# Patient Record
Sex: FEMALE | Race: WHITE | NOT HISPANIC OR LATINO | Employment: FULL TIME | URBAN - METROPOLITAN AREA
[De-identification: names, ages, dates, MRNs, and addresses within clinical notes are randomized per-mention and may not be internally consistent; named-entity substitution may affect disease eponyms.]

---

## 2017-02-13 ENCOUNTER — ALLSCRIPTS OFFICE VISIT (OUTPATIENT)
Dept: OTHER | Facility: OTHER | Age: 47
End: 2017-02-13

## 2017-03-29 ENCOUNTER — ALLSCRIPTS OFFICE VISIT (OUTPATIENT)
Dept: OTHER | Facility: OTHER | Age: 47
End: 2017-03-29

## 2017-11-29 ENCOUNTER — HOSPITAL ENCOUNTER (OUTPATIENT)
Dept: MAMMOGRAPHY | Facility: MEDICAL CENTER | Age: 47
Discharge: HOME | End: 2017-11-29
Payer: COMMERCIAL

## 2017-11-29 DIAGNOSIS — Z12.31 ENCOUNTER FOR SCREENING MAMMOGRAM FOR MALIGNANT NEOPLASM OF BREAST: ICD-10-CM

## 2017-11-29 PROCEDURE — 77063 BREAST TOMOSYNTHESIS BI: CPT

## 2017-11-29 PROCEDURE — G0202 SCR MAMMO BI INCL CAD: HCPCS

## 2018-01-10 NOTE — MISCELLANEOUS
Message   Recorded as Task   Date: 11/15/2016 05:06 PM, Created By: [facility]   Task Name: Care Coordination   Assigned To: Jasmyne Maurer   Regarding Patient: Marquez aJcob, Status: Active   Comment:    Perlita Brooks - 15 Nov 2016 5:06 PM     TASK CREATED  pt called req correction of 3d karlo slip given to her    ins will not pay with diag of dense breasts    needs to be submitted as screening    new slip placed in allscripts and faxed to [facility]        Active Problems    1  Acute recurrent maxillary sinusitis (461 0) (J01 01)   2  Anxiety (300 00) (F41 9)   3  Dense breasts (793 82) (R92 2)   4  Encounter for gynecological examination without abnormal finding (V72 31) (Z01 419)   5  Encounter for routine gynecological examination (V72 31) (Z01 419)   6  Encounter for screening mammogram for malignant neoplasm of breast (V76 12)   (Z12 31)   7  Need for influenza vaccination (V04 81) (Z23)   8  Nephrolithiasis (592 0) (N20 0)   9  Screening for endocrine, nutritional, metabolic and immunity disorder (V77 99)   (Z13 29,Z13 0,Z13 21,Z13 228)   10  Screening for human papillomavirus (HPV) (V73 81) (Z11 51)   11  Screening for lipoid disorders (V77 91) (Z13 220)   12  Screening for thyroid disorder (V77 0) (Z13 29)   13  Screening, anemia, deficiency, iron (V78 0) (Z13 0)   14  Thyromegaly (240 9) (E04 9)   15  Visit for screening mammogram (V76 12) (Z12 31)   16  Vitamin D deficiency (268 9) (E55 9)    Current Meds   1  Calcium + D3 600-200 MG-UNIT Oral Tablet; Therapy: (Recorded:[id number]) to Recorded   2  Cefuroxime Axetil 250 MG Oral Tablet; TAKE 1 TABLET EVERY 12 HOURS DAILY; Therapy: 64NDH4414 to (Evaluate:17Jan2016)  Requested for: 98RXZ3205; Last   Rx:07Jan2016 Ordered   3  LORazepam 0 5 MG Oral Tablet; TAKE ONE (1) TABLET(S) TWICE DAILY; Therapy: 42NQO2308 to (Evaluate:15Ods1777); Last Rx:07Nov2016 Ordered    Allergies    1  No Known Drug Allergies    2   Seasonal    Plan  Encounter for screening mammogram for malignant neoplasm of breast    · MAMMO SCREENING BILATERAL W 3D & CAD; Status:Hold For -  Scheduling,Retrospective By Protocol Authorization;  Requested for:[id number];     Signatures   Electronically signed by : Terri Patterson, ; Nov 15 2016  5:06PM EST                       (Author)

## 2018-01-13 VITALS
HEART RATE: 75 BPM | TEMPERATURE: 98.7 F | SYSTOLIC BLOOD PRESSURE: 110 MMHG | BODY MASS INDEX: 21.26 KG/M2 | HEIGHT: 70 IN | DIASTOLIC BLOOD PRESSURE: 80 MMHG | OXYGEN SATURATION: 98 % | WEIGHT: 148.5 LBS

## 2018-01-13 VITALS
SYSTOLIC BLOOD PRESSURE: 106 MMHG | WEIGHT: 149 LBS | DIASTOLIC BLOOD PRESSURE: 62 MMHG | BODY MASS INDEX: 21.33 KG/M2 | HEIGHT: 70 IN

## 2018-02-14 ENCOUNTER — OFFICE VISIT (OUTPATIENT)
Dept: OBGYN CLINIC | Facility: CLINIC | Age: 48
End: 2018-02-14
Payer: COMMERCIAL

## 2018-02-14 VITALS
HEIGHT: 69 IN | BODY MASS INDEX: 21.74 KG/M2 | WEIGHT: 146.8 LBS | SYSTOLIC BLOOD PRESSURE: 118 MMHG | DIASTOLIC BLOOD PRESSURE: 72 MMHG

## 2018-02-14 DIAGNOSIS — Z01.419 ENCOUNTER FOR GYNECOLOGICAL EXAMINATION WITHOUT ABNORMAL FINDING: Primary | ICD-10-CM

## 2018-02-14 DIAGNOSIS — Z12.31 ENCOUNTER FOR SCREENING MAMMOGRAM FOR MALIGNANT NEOPLASM OF BREAST: ICD-10-CM

## 2018-02-14 DIAGNOSIS — N81.6 RECTOCELE: ICD-10-CM

## 2018-02-14 PROCEDURE — S0612 ANNUAL GYNECOLOGICAL EXAMINA: HCPCS | Performed by: OBSTETRICS & GYNECOLOGY

## 2018-02-14 RX ORDER — LORAZEPAM 0.5 MG/1
1 TABLET ORAL 2 TIMES DAILY
COMMUNITY
Start: 2017-03-29 | End: 2020-06-15 | Stop reason: SDUPTHER

## 2018-02-14 NOTE — PROGRESS NOTES
Assessment/Plan:    No problem-specific Assessment & Plan notes found for this encounter  Diagnoses and all orders for this visit:    Encounter for gynecological examination without abnormal finding    Encounter for screening mammogram for malignant neoplasm of breast  -     Mammo screening bilateral w 3d & cad; Future    Rectocele    Other orders  -     LORazepam (ATIVAN) 0 5 mg tablet; Take 1 tablet by mouth 2 (two) times a day  -     Calcium Carb-Cholecalciferol (CALCIUM + D3) 600-200 MG-UNIT TABS; Take by mouth        Annual examination was completed  Mammogram was ordered  We discussed her rectocele and weak pelvic muscles  Kegel's exercises were reviewed  Patient was also given other precautions to help minimize her pelvic relaxation  She will call if that becomes a progressive issue otherwise to return to the office in 1 year  Subjective:      Patient ID: Niko Rodriguez is a 50 y o  female  Patient returns for annual gyn visit  She has no significant new medical surgical issues  She does note continued premenstrual breast discomfort she has no menses since her ablation  Patient also noting a new issue with a slight bulge at the vagina with significant straining  She has no bothersome bowel or bladder issues  Gynecologic Exam   The patient's pertinent negatives include no pelvic pain or vaginal discharge  The following portions of the patient's history were reviewed and updated as appropriate: allergies, current medications, past family history, past medical history, past social history, past surgical history and problem list     Review of Systems   Constitutional: Negative  HENT: Negative  Eyes: Negative  Respiratory: Negative  Cardiovascular: Negative  Gastrointestinal: Negative  Endocrine: Negative  Genitourinary: Negative for menstrual problem (normal flow and duration), pelvic pain, vaginal discharge and vaginal pain  Musculoskeletal: Negative  Skin: Negative  Allergic/Immunologic: Negative  Neurological: Negative  Psychiatric/Behavioral: Negative  Objective:    Vitals:    02/14/18 1017   BP: 118/72        Physical Exam   Constitutional: She is oriented to person, place, and time  She appears well-developed and well-nourished  HENT:   Head: Normocephalic and atraumatic  Nose: Nose normal    Eyes: EOM are normal  Pupils are equal, round, and reactive to light  Neck: Normal range of motion  Neck supple  No thyromegaly present  Cardiovascular: Normal rate and regular rhythm  Pulmonary/Chest: Effort normal and breath sounds normal  No respiratory distress  Breasts no masses, tenderness, skin changes   Abdominal: Soft  Bowel sounds are normal  She exhibits no distension and no mass  There is no tenderness  Hernia confirmed negative in the right inguinal area and confirmed negative in the left inguinal area  Genitourinary: Vagina normal and uterus normal  No breast swelling, tenderness, discharge or bleeding  Pelvic exam was performed with patient supine  No labial fusion  There is no rash, tenderness, lesion or injury on the right labia  There is no rash, tenderness, lesion or injury on the left labia  Cervix exhibits no motion tenderness, no discharge and no friability  Genitourinary Comments: Ext genitalia nl female w/o lesions  Cervix healthy w/o lesions or discharge  uterus nl size, NT, no mass  Adnexa NT, no mass  Rectocele  Levators weak b/l   Musculoskeletal: Normal range of motion  She exhibits no edema  Lymphadenopathy:        Right: No inguinal adenopathy present  Left: No inguinal adenopathy present  Neurological: She is alert and oriented to person, place, and time  She has normal reflexes  Skin: Skin is warm and dry  No rash noted  Psychiatric: She has a normal mood and affect  Her behavior is normal  Thought content normal    Nursing note and vitals reviewed

## 2018-12-05 ENCOUNTER — HOSPITAL ENCOUNTER (OUTPATIENT)
Dept: MAMMOGRAPHY | Facility: MEDICAL CENTER | Age: 48
Discharge: HOME | End: 2018-12-05
Payer: COMMERCIAL

## 2018-12-05 VITALS — BODY MASS INDEX: 23.7 KG/M2 | HEIGHT: 69 IN | WEIGHT: 160 LBS

## 2018-12-05 DIAGNOSIS — Z12.31 ENCOUNTER FOR SCREENING MAMMOGRAM FOR MALIGNANT NEOPLASM OF BREAST: ICD-10-CM

## 2018-12-05 PROCEDURE — 77063 BREAST TOMOSYNTHESIS BI: CPT

## 2018-12-05 PROCEDURE — 77067 SCR MAMMO BI INCL CAD: CPT

## 2019-02-20 ENCOUNTER — ANNUAL EXAM (OUTPATIENT)
Dept: OBGYN CLINIC | Facility: CLINIC | Age: 49
End: 2019-02-20
Payer: COMMERCIAL

## 2019-02-20 VITALS
SYSTOLIC BLOOD PRESSURE: 118 MMHG | WEIGHT: 160.2 LBS | DIASTOLIC BLOOD PRESSURE: 62 MMHG | HEIGHT: 69 IN | BODY MASS INDEX: 23.73 KG/M2

## 2019-02-20 DIAGNOSIS — Z01.419 ENCOUNTER FOR GYNECOLOGICAL EXAMINATION (GENERAL) (ROUTINE) WITHOUT ABNORMAL FINDINGS: Primary | ICD-10-CM

## 2019-02-20 DIAGNOSIS — Z12.39 SCREENING FOR MALIGNANT NEOPLASM OF BREAST: ICD-10-CM

## 2019-02-20 DIAGNOSIS — R92.2 DENSE BREAST: ICD-10-CM

## 2019-02-20 DIAGNOSIS — Z11.51 SCREENING FOR HUMAN PAPILLOMAVIRUS (HPV): ICD-10-CM

## 2019-02-20 DIAGNOSIS — R63.5 WEIGHT GAIN: ICD-10-CM

## 2019-02-20 DIAGNOSIS — Z12.4 CERVICAL CANCER SCREENING: ICD-10-CM

## 2019-02-20 PROBLEM — R92.30 DENSE BREAST: Status: ACTIVE | Noted: 2019-02-20

## 2019-02-20 PROCEDURE — S0612 ANNUAL GYNECOLOGICAL EXAMINA: HCPCS | Performed by: OBSTETRICS & GYNECOLOGY

## 2019-02-20 PROCEDURE — 87624 HPV HI-RISK TYP POOLED RSLT: CPT | Performed by: OBSTETRICS & GYNECOLOGY

## 2019-02-20 PROCEDURE — G0145 SCR C/V CYTO,THINLAYER,RESCR: HCPCS | Performed by: PATHOLOGY

## 2019-02-20 PROCEDURE — G0124 SCREEN C/V THIN LAYER BY MD: HCPCS | Performed by: PATHOLOGY

## 2019-02-20 NOTE — PROGRESS NOTES
Assessment/Plan:    No problem-specific Assessment & Plan notes found for this encounter  Diagnoses and all orders for this visit:    Encounter for gynecological examination (general) (routine) without abnormal findings  -     Liquid-based pap, screening    Dense breast  -     Mammo screening bilateral w 3d & cad; Future    Screening for malignant neoplasm of breast  -     Mammo screening bilateral w 3d & cad; Future    Cervical cancer screening  -     Liquid-based pap, screening    Screening for human papillomavirus (HPV)  -     Liquid-based pap, screening    Weight gain  -     TSH, 3rd generation; Future        Annual examination was completed  Pap with HPV testing was obtained  Mammogram was ordered  Patient was ordered a TSH to rule out thyroid disease as she has a strong family history  We did discuss the typical chronology of events with menopause  I have encouraged patient increase her exercise routine  Follow-up will be via phone patient otherwise return to the office in 1 year  Subjective:      Patient ID: Leelee Hankins is a 46 y o  female  Patient returns for annual gyn visit  She has complains only of 10 lb weight gain over this past year  She does have regular exercise and good nutrition  She does continue to have occasional spotting primarily with bowel movements  She has no significant menopausal symptoms  Gynecologic Exam   The patient's pertinent negatives include no pelvic pain or vaginal discharge  The following portions of the patient's history were reviewed and updated as appropriate:   She  has a past medical history of Anemia, External hemorrhoids, Hematuria, Lyme disease (11/2004), Migraine, and Varicose veins of both lower extremities    She   Patient Active Problem List    Diagnosis Date Noted    Encounter for gynecological examination (general) (routine) without abnormal findings 02/20/2019    Dense breast 02/20/2019    Screening for malignant neoplasm of breast 02/20/2019    Cervical cancer screening 02/20/2019    Screening for human papillomavirus (HPV) 02/20/2019    Weight gain 02/20/2019    Rectocele 02/14/2018     She  has a past surgical history that includes Dilation and curettage, diagnostic / therapeutic; Endometrial biopsy (10/21/2008); Polypectomy; Hemorrhoid surgery; and Hysteroscopy w/ endometrial ablation (05/24/2010)  Her family history includes Anemia in her family; Anxiety disorder in her family; Arthritis in her family; Depression in her family; Diabetes in her family and paternal grandmother; Heart disease in her mother; Hypertension in her mother; Lung cancer in her family; Migraines in her family; Paget's disease of bone in her family; Paget's disease of bone (age of onset: 76) in her mother; Prostate cancer in her father; Thyroid disease in her family; Varicose Veins in her family  She  reports that she has never smoked  She has never used smokeless tobacco  She reports that she drinks alcohol  She reports that she does not use drugs  Current Outpatient Medications   Medication Sig Dispense Refill    Calcium Carb-Cholecalciferol (CALCIUM + D3) 600-200 MG-UNIT TABS Take by mouth      LORazepam (ATIVAN) 0 5 mg tablet Take 1 tablet by mouth 2 (two) times a day       No current facility-administered medications for this visit  Current Outpatient Medications on File Prior to Visit   Medication Sig    Calcium Carb-Cholecalciferol (CALCIUM + D3) 600-200 MG-UNIT TABS Take by mouth    LORazepam (ATIVAN) 0 5 mg tablet Take 1 tablet by mouth 2 (two) times a day     No current facility-administered medications on file prior to visit  She is allergic to pollen extract       Review of Systems   Constitutional: Negative  HENT: Negative  Eyes: Negative  Respiratory: Negative  Cardiovascular: Negative  Gastrointestinal: Negative  Endocrine: Negative      Genitourinary: Negative for menstrual problem (normal flow and duration), pelvic pain, vaginal discharge and vaginal pain  Musculoskeletal: Negative  Skin: Negative  Allergic/Immunologic: Negative  Neurological: Negative  Psychiatric/Behavioral: Negative  All other systems reviewed and are negative  Objective:      /62 (BP Location: Left arm, Patient Position: Sitting)   Ht 5' 9" (1 753 m)   Wt 72 7 kg (160 lb 3 2 oz)   BMI 23 66 kg/m²          Physical Exam   Constitutional: She is oriented to person, place, and time  She appears well-developed and well-nourished  HENT:   Head: Normocephalic and atraumatic  Nose: Nose normal    Eyes: Pupils are equal, round, and reactive to light  EOM are normal    Neck: Normal range of motion  Neck supple  No thyromegaly present  Cardiovascular: Normal rate and regular rhythm  Pulmonary/Chest: Effort normal and breath sounds normal  No respiratory distress  No breast tenderness, discharge or bleeding  Breasts symmetrical without masses, skin changes or adenopathy   Abdominal: Soft  Bowel sounds are normal  She exhibits no distension and no mass  There is no tenderness  Hernia confirmed negative in the right inguinal area and confirmed negative in the left inguinal area  Genitourinary: Vagina normal and uterus normal  No breast tenderness, discharge or bleeding  Pelvic exam was performed with patient supine  No labial fusion  There is no rash, tenderness, lesion or injury on the right labia  There is no rash, tenderness, lesion or injury on the left labia  Cervix exhibits no motion tenderness, no discharge and no friability  Genitourinary Comments: Ext gen nl w/o lesions  Vag healthy w/o lesions or discharge  Cervix healthy w/o lesions  Uterus nl size, NT  Adnexa NT no masses  Rectal no masses   Musculoskeletal: Normal range of motion  She exhibits no edema  Lymphadenopathy:        Right: No inguinal adenopathy present  Left: No inguinal adenopathy present     Neurological: She is alert and oriented to person, place, and time  She has normal reflexes  Skin: Skin is warm and dry  No rash noted  Psychiatric: She has a normal mood and affect  Her behavior is normal  Thought content normal    Nursing note and vitals reviewed

## 2019-02-22 LAB
HPV HR 12 DNA CVX QL NAA+PROBE: POSITIVE
HPV16 DNA CVX QL NAA+PROBE: NEGATIVE
HPV18 DNA CVX QL NAA+PROBE: NEGATIVE

## 2019-02-27 LAB
LAB AP GYN PRIMARY INTERPRETATION: ABNORMAL
Lab: ABNORMAL
PATH INTERP SPEC-IMP: ABNORMAL

## 2019-03-05 ENCOUNTER — TELEPHONE (OUTPATIENT)
Dept: OBGYN CLINIC | Facility: CLINIC | Age: 49
End: 2019-03-05

## 2019-03-05 NOTE — TELEPHONE ENCOUNTER
Patient scheduled for 5/1/19 at 1:00 for colposcopy  She is aware to take 2 aleve/ibuprofen prior to appt

## 2019-03-05 NOTE — TELEPHONE ENCOUNTER
----- Message from Juvenal Norris DO sent at 2/28/2019  9:28 AM EST -----  Please inform patient that Pap smear demonstrates ASCUS with positive high-risk HPV  Please arrange colposcopy within the next 8 weeks

## 2019-03-28 ENCOUNTER — TELEPHONE (OUTPATIENT)
Dept: OBGYN CLINIC | Facility: CLINIC | Age: 49
End: 2019-03-28

## 2019-03-28 LAB — TSH SERPL DL<=0.005 MIU/L-ACNC: 2.72 UIU/ML (ref 0.45–4.5)

## 2019-05-01 ENCOUNTER — OFFICE VISIT (OUTPATIENT)
Dept: OBGYN CLINIC | Facility: CLINIC | Age: 49
End: 2019-05-01
Payer: COMMERCIAL

## 2019-05-01 VITALS — DIASTOLIC BLOOD PRESSURE: 80 MMHG | BODY MASS INDEX: 23.75 KG/M2 | SYSTOLIC BLOOD PRESSURE: 124 MMHG | WEIGHT: 160.8 LBS

## 2019-05-01 DIAGNOSIS — R87.810 ASCUS WITH POSITIVE HIGH RISK HPV CERVICAL: Primary | ICD-10-CM

## 2019-05-01 DIAGNOSIS — R87.610 ASCUS WITH POSITIVE HIGH RISK HPV CERVICAL: Primary | ICD-10-CM

## 2019-05-01 PROCEDURE — 99213 OFFICE O/P EST LOW 20 MIN: CPT | Performed by: OBSTETRICS & GYNECOLOGY

## 2019-05-01 PROCEDURE — 88305 TISSUE EXAM BY PATHOLOGIST: CPT | Performed by: PATHOLOGY

## 2019-05-01 PROCEDURE — 57454 BX/CURETT OF CERVIX W/SCOPE: CPT | Performed by: OBSTETRICS & GYNECOLOGY

## 2019-05-06 ENCOUNTER — TELEPHONE (OUTPATIENT)
Dept: OBGYN CLINIC | Facility: CLINIC | Age: 49
End: 2019-05-06

## 2020-06-09 ENCOUNTER — ANNUAL EXAM (OUTPATIENT)
Dept: OBGYN CLINIC | Facility: CLINIC | Age: 50
End: 2020-06-09
Payer: COMMERCIAL

## 2020-06-09 VITALS
SYSTOLIC BLOOD PRESSURE: 110 MMHG | DIASTOLIC BLOOD PRESSURE: 68 MMHG | BODY MASS INDEX: 23.91 KG/M2 | TEMPERATURE: 97.4 F | WEIGHT: 161.4 LBS | HEIGHT: 69 IN

## 2020-06-09 DIAGNOSIS — Z12.4 CERVICAL CANCER SCREENING: ICD-10-CM

## 2020-06-09 DIAGNOSIS — Z11.51 SCREENING FOR HUMAN PAPILLOMAVIRUS (HPV): ICD-10-CM

## 2020-06-09 DIAGNOSIS — Z12.31 ENCOUNTER FOR SCREENING MAMMOGRAM FOR MALIGNANT NEOPLASM OF BREAST: ICD-10-CM

## 2020-06-09 DIAGNOSIS — Z01.419 ENCOUNTER FOR GYNECOLOGICAL EXAMINATION (GENERAL) (ROUTINE) WITHOUT ABNORMAL FINDINGS: Primary | ICD-10-CM

## 2020-06-09 PROCEDURE — 87624 HPV HI-RISK TYP POOLED RSLT: CPT | Performed by: OBSTETRICS & GYNECOLOGY

## 2020-06-09 PROCEDURE — G0145 SCR C/V CYTO,THINLAYER,RESCR: HCPCS | Performed by: OBSTETRICS & GYNECOLOGY

## 2020-06-09 PROCEDURE — 99396 PREV VISIT EST AGE 40-64: CPT | Performed by: OBSTETRICS & GYNECOLOGY

## 2020-06-15 ENCOUNTER — OFFICE VISIT (OUTPATIENT)
Dept: FAMILY MEDICINE CLINIC | Facility: CLINIC | Age: 50
End: 2020-06-15
Payer: COMMERCIAL

## 2020-06-15 VITALS
TEMPERATURE: 98.3 F | BODY MASS INDEX: 23.81 KG/M2 | WEIGHT: 160.75 LBS | SYSTOLIC BLOOD PRESSURE: 118 MMHG | OXYGEN SATURATION: 96 % | HEART RATE: 70 BPM | DIASTOLIC BLOOD PRESSURE: 76 MMHG | HEIGHT: 69 IN

## 2020-06-15 DIAGNOSIS — F41.1 GENERALIZED ANXIETY DISORDER: ICD-10-CM

## 2020-06-15 DIAGNOSIS — Z13.0 SCREENING FOR IRON DEFICIENCY ANEMIA: ICD-10-CM

## 2020-06-15 DIAGNOSIS — Z13.0 SCREENING FOR ENDOCRINE, METABOLIC AND IMMUNITY DISORDER: ICD-10-CM

## 2020-06-15 DIAGNOSIS — Z13.29 SCREENING FOR ENDOCRINE, METABOLIC AND IMMUNITY DISORDER: ICD-10-CM

## 2020-06-15 DIAGNOSIS — Z13.29 SCREENING FOR THYROID DISORDER: ICD-10-CM

## 2020-06-15 DIAGNOSIS — M25.50 MULTIPLE JOINT PAIN: ICD-10-CM

## 2020-06-15 DIAGNOSIS — Z13.228 SCREENING FOR ENDOCRINE, METABOLIC AND IMMUNITY DISORDER: ICD-10-CM

## 2020-06-15 DIAGNOSIS — Z13.220 SCREENING FOR LIPID DISORDERS: ICD-10-CM

## 2020-06-15 DIAGNOSIS — Z00.00 ANNUAL PHYSICAL EXAM: Primary | ICD-10-CM

## 2020-06-15 PROCEDURE — 99386 PREV VISIT NEW AGE 40-64: CPT | Performed by: FAMILY MEDICINE

## 2020-06-15 PROCEDURE — 3008F BODY MASS INDEX DOCD: CPT | Performed by: FAMILY MEDICINE

## 2020-06-15 RX ORDER — DOXYCYCLINE HYCLATE 100 MG/1
100 CAPSULE ORAL EVERY 12 HOURS SCHEDULED
Qty: 28 CAPSULE | Refills: 0 | Status: SHIPPED | OUTPATIENT
Start: 2020-06-15 | End: 2020-06-29

## 2020-06-15 RX ORDER — LORAZEPAM 0.5 MG/1
0.5 TABLET ORAL 2 TIMES DAILY
Qty: 30 TABLET | Refills: 0 | Status: SHIPPED | OUTPATIENT
Start: 2020-06-15 | End: 2021-07-29 | Stop reason: SDUPTHER

## 2020-06-17 LAB
ALBUMIN SERPL-MCNC: 4.3 G/DL (ref 3.6–5.1)
ALBUMIN/GLOB SERPL: 1.9 (CALC) (ref 1–2.5)
ALP SERPL-CCNC: 40 U/L (ref 37–153)
ALT SERPL-CCNC: 8 U/L (ref 6–29)
AST SERPL-CCNC: 10 U/L (ref 10–35)
B BURGDOR AB SER IA-ACNC: <0.9 INDEX
BASOPHILS # BLD AUTO: 19 CELLS/UL (ref 0–200)
BASOPHILS NFR BLD AUTO: 0.5 %
BILIRUB SERPL-MCNC: 0.5 MG/DL (ref 0.2–1.2)
BUN SERPL-MCNC: 20 MG/DL (ref 7–25)
BUN/CREAT SERPL: ABNORMAL (CALC) (ref 6–22)
CALCIUM SERPL-MCNC: 8.9 MG/DL (ref 8.6–10.4)
CHLORIDE SERPL-SCNC: 104 MMOL/L (ref 98–110)
CHOLEST SERPL-MCNC: 169 MG/DL
CHOLEST/HDLC SERPL: 2.6 (CALC)
CO2 SERPL-SCNC: 28 MMOL/L (ref 20–32)
CREAT SERPL-MCNC: 0.6 MG/DL (ref 0.5–1.05)
CRP SERPL-MCNC: 0.9 MG/L
EOSINOPHIL # BLD AUTO: 61 CELLS/UL (ref 15–500)
EOSINOPHIL NFR BLD AUTO: 1.6 %
ERYTHROCYTE [DISTWIDTH] IN BLOOD BY AUTOMATED COUNT: 12.3 % (ref 11–15)
GLOBULIN SER CALC-MCNC: 2.3 G/DL (CALC) (ref 1.9–3.7)
GLUCOSE SERPL-MCNC: 107 MG/DL (ref 65–99)
HCT VFR BLD AUTO: 37 % (ref 35–45)
HDLC SERPL-MCNC: 66 MG/DL
HGB BLD-MCNC: 12.3 G/DL (ref 11.7–15.5)
LDLC SERPL CALC-MCNC: 88 MG/DL (CALC)
LYMPHOCYTES # BLD AUTO: 1395 CELLS/UL (ref 850–3900)
LYMPHOCYTES NFR BLD AUTO: 36.7 %
MCH RBC QN AUTO: 31.6 PG (ref 27–33)
MCHC RBC AUTO-ENTMCNC: 33.2 G/DL (ref 32–36)
MCV RBC AUTO: 95.1 FL (ref 80–100)
MONOCYTES # BLD AUTO: 258 CELLS/UL (ref 200–950)
MONOCYTES NFR BLD AUTO: 6.8 %
NEUTROPHILS # BLD AUTO: 2067 CELLS/UL (ref 1500–7800)
NEUTROPHILS NFR BLD AUTO: 54.4 %
NONHDLC SERPL-MCNC: 103 MG/DL (CALC)
PLATELET # BLD AUTO: 283 THOUSAND/UL (ref 140–400)
PMV BLD REES-ECKER: 10.2 FL (ref 7.5–12.5)
POTASSIUM SERPL-SCNC: 4.3 MMOL/L (ref 3.5–5.3)
PROT SERPL-MCNC: 6.6 G/DL (ref 6.1–8.1)
RBC # BLD AUTO: 3.89 MILLION/UL (ref 3.8–5.1)
RHEUMATOID FACT SERPL-ACNC: <14 IU/ML
SL AMB EGFR AFRICAN AMERICAN: 122 ML/MIN/1.73M2
SL AMB EGFR NON AFRICAN AMERICAN: 106 ML/MIN/1.73M2
SODIUM SERPL-SCNC: 139 MMOL/L (ref 135–146)
TRIGL SERPL-MCNC: 65 MG/DL
TSH SERPL-ACNC: 3.28 MIU/L
WBC # BLD AUTO: 3.8 THOUSAND/UL (ref 3.8–10.8)

## 2020-06-20 LAB
LAB AP GYN PRIMARY INTERPRETATION: NORMAL
Lab: NORMAL

## 2020-06-30 ENCOUNTER — TELEPHONE (OUTPATIENT)
Dept: OBGYN CLINIC | Facility: CLINIC | Age: 50
End: 2020-06-30

## 2020-06-30 NOTE — TELEPHONE ENCOUNTER
----- Message from Nichole Mohs, MD sent at 6/22/2020 11:13 AM EDT -----  Can you let Yajaira Roland know that her pap has improved and the cells appeared normal, but her HPV was still positive - so would advise another colposcopy    Thanks

## 2020-07-09 NOTE — TELEPHONE ENCOUNTER
I had to send a message through my chart, patient states she is not getting my messages  Colposcopy has been scheduled for 7/21 at 10:20  She is aware to take advil or aleve prior

## 2020-07-21 ENCOUNTER — PROCEDURE VISIT (OUTPATIENT)
Dept: OBGYN CLINIC | Facility: CLINIC | Age: 50
End: 2020-07-21
Payer: COMMERCIAL

## 2020-07-21 VITALS
BODY MASS INDEX: 23.18 KG/M2 | SYSTOLIC BLOOD PRESSURE: 124 MMHG | WEIGHT: 157 LBS | DIASTOLIC BLOOD PRESSURE: 70 MMHG | TEMPERATURE: 98 F

## 2020-07-21 DIAGNOSIS — B97.7 HPV (HUMAN PAPILLOMA VIRUS) INFECTION: Primary | ICD-10-CM

## 2020-07-21 PROCEDURE — 88305 TISSUE EXAM BY PATHOLOGIST: CPT | Performed by: PATHOLOGY

## 2020-07-21 PROCEDURE — 57454 BX/CURETT OF CERVIX W/SCOPE: CPT | Performed by: OBSTETRICS & GYNECOLOGY

## 2020-07-21 NOTE — PROGRESS NOTES
Colposcopy  Date/Time: 7/21/2020 1:02 PM  Performed by: Judith Argueta MD  Authorized by: Judith Argueta MD     Consent:     Consent obtained:  Verbal    Consent given by:  Patient    Patient questions answered: yes      Patient agrees, verbalizes understanding, and wants to proceed: yes    Pre-procedure:     Prepped with: acetic acid    Indication:     Indications: persistent HPV  Procedure:     Procedure: Colposcopy w/ cervical biopsy and ECC      Under satisfactory analgesia the patient was prepped and draped in the dorsal lithotomy position: yes      [place] speculum was placed in the vagina: yes      Under colposcopic examination the transition zone was seen in entirety: yes      Endocervix was curetted using a Kevorkian curette: yes      Cervical biopsy performed with a cervical biopsy punch: yes      Monsel's solution was applied: yes      Biopsy(s): yes      Location:  1    Specimen to pathology: yes    Post-procedure:     Impression: Low grade cervical dysplasia      Patient tolerance of procedure:   Tolerated well, no immediate complications

## 2020-07-24 ENCOUNTER — HOSPITAL ENCOUNTER (OUTPATIENT)
Dept: MAMMOGRAPHY | Facility: MEDICAL CENTER | Age: 50
Discharge: HOME | End: 2020-07-24
Payer: COMMERCIAL

## 2020-07-24 VITALS — BODY MASS INDEX: 23.25 KG/M2 | HEIGHT: 69 IN | WEIGHT: 157 LBS

## 2020-07-24 DIAGNOSIS — Z12.31 ENCOUNTER FOR SCREENING MAMMOGRAM FOR MALIGNANT NEOPLASM OF BREAST: ICD-10-CM

## 2020-07-24 PROCEDURE — 77067 SCR MAMMO BI INCL CAD: CPT

## 2020-07-24 PROCEDURE — 77063 BREAST TOMOSYNTHESIS BI: CPT

## 2020-07-31 ENCOUNTER — TELEPHONE (OUTPATIENT)
Dept: OBGYN CLINIC | Facility: CLINIC | Age: 50
End: 2020-07-31

## 2021-04-09 DIAGNOSIS — Z23 ENCOUNTER FOR IMMUNIZATION: ICD-10-CM

## 2021-04-19 ENCOUNTER — IMMUNIZATIONS (OUTPATIENT)
Dept: FAMILY MEDICINE CLINIC | Facility: HOSPITAL | Age: 51
End: 2021-04-19

## 2021-04-19 DIAGNOSIS — Z23 ENCOUNTER FOR IMMUNIZATION: Primary | ICD-10-CM

## 2021-04-19 PROCEDURE — 91300 SARS-COV-2 / COVID-19 MRNA VACCINE (PFIZER-BIONTECH) 30 MCG: CPT

## 2021-04-19 PROCEDURE — 0001A SARS-COV-2 / COVID-19 MRNA VACCINE (PFIZER-BIONTECH) 30 MCG: CPT

## 2021-05-10 ENCOUNTER — IMMUNIZATIONS (OUTPATIENT)
Dept: FAMILY MEDICINE CLINIC | Facility: HOSPITAL | Age: 51
End: 2021-05-10

## 2021-05-10 DIAGNOSIS — Z23 ENCOUNTER FOR IMMUNIZATION: Primary | ICD-10-CM

## 2021-05-10 PROCEDURE — 0002A SARS-COV-2 / COVID-19 MRNA VACCINE (PFIZER-BIONTECH) 30 MCG: CPT

## 2021-05-10 PROCEDURE — 91300 SARS-COV-2 / COVID-19 MRNA VACCINE (PFIZER-BIONTECH) 30 MCG: CPT

## 2021-06-03 ENCOUNTER — ANNUAL EXAM (OUTPATIENT)
Dept: OBGYN CLINIC | Facility: CLINIC | Age: 51
End: 2021-06-03
Payer: COMMERCIAL

## 2021-06-03 VITALS
DIASTOLIC BLOOD PRESSURE: 62 MMHG | WEIGHT: 150.6 LBS | BODY MASS INDEX: 22.31 KG/M2 | SYSTOLIC BLOOD PRESSURE: 110 MMHG | HEIGHT: 69 IN

## 2021-06-03 DIAGNOSIS — Z12.4 CERVICAL CANCER SCREENING: ICD-10-CM

## 2021-06-03 DIAGNOSIS — Z01.419 ENCOUNTER FOR GYNECOLOGICAL EXAMINATION WITHOUT ABNORMAL FINDING: Primary | ICD-10-CM

## 2021-06-03 DIAGNOSIS — B97.7 HIGH RISK HPV INFECTION: ICD-10-CM

## 2021-06-03 DIAGNOSIS — Z11.51 ENCOUNTER FOR SCREENING FOR HUMAN PAPILLOMAVIRUS (HPV): ICD-10-CM

## 2021-06-03 DIAGNOSIS — Z12.31 ENCOUNTER FOR SCREENING MAMMOGRAM FOR MALIGNANT NEOPLASM OF BREAST: ICD-10-CM

## 2021-06-03 PROCEDURE — 3008F BODY MASS INDEX DOCD: CPT | Performed by: OBSTETRICS & GYNECOLOGY

## 2021-06-03 PROCEDURE — 99396 PREV VISIT EST AGE 40-64: CPT | Performed by: OBSTETRICS & GYNECOLOGY

## 2021-06-03 PROCEDURE — 87624 HPV HI-RISK TYP POOLED RSLT: CPT | Performed by: OBSTETRICS & GYNECOLOGY

## 2021-06-03 PROCEDURE — G0145 SCR C/V CYTO,THINLAYER,RESCR: HCPCS | Performed by: PATHOLOGY

## 2021-06-03 PROCEDURE — 1036F TOBACCO NON-USER: CPT | Performed by: OBSTETRICS & GYNECOLOGY

## 2021-06-03 PROCEDURE — G0124 SCREEN C/V THIN LAYER BY MD: HCPCS | Performed by: PATHOLOGY

## 2021-06-03 NOTE — PROGRESS NOTES
Scott Stearns   1970    CC:  Yearly exam    S:  44 y o  female here for yearly exam  She is s/p endometrial ablation and has had no bleeding in the last year  Some occasional hot flashes/night sweats - worse with alcohol  She denies vaginal discharge, itching, odor or dryness  Sexual activity: She is sexually active without pain, bleeding or dryness  Does occasionally use a lubricant  No urinary concerns/incontinence  Bowels moving normally  No breast concerns   had vasectomy   & monogamous  Last Pap:   2/20/19 ASCUS, other HR HPV positive, colpo benign  6/9/20  Normal, other HR HPV positive, colpo benign    Last Mammo: 7/24/20 - [facility]  Last Colonoscopy: due (had ~ 10yrs ago)    We reviewed [facility] guidelines for Pap testing  Family hx of breast cancer:  Mother  Family hx of ovarian cancer: no  Family hx of colon cancer: no      Current Outpatient Medications:     Calcium Carb-Cholecalciferol (CALCIUM + D3) 600-200 MG-UNIT TABS, Take by mouth, Disp: , Rfl:     LORazepam (ATIVAN) 0 5 mg tablet, Take 1 tablet (0 5 mg total) by mouth 2 (two) times a day, Disp: 30 tablet, Rfl: 0  Social History     Socioeconomic History    Marital status: /Civil Union     Spouse name: Not on file    Number of children: Not on file    Years of education: Not on file    Highest education level: Not on file   Occupational History    Not on file   Social Needs    Financial resource strain: Not on file    Food insecurity     Worry: Not on file     Inability: Not on file   [employer] needs     Medical: Not on file     Non-medical: Not on file   Tobacco Use    Smoking status: Never Smoker    Smokeless tobacco: Never Used   Substance and Sexual Activity    Alcohol use: Yes     Frequency: Monthly or less     Drinks per session: 1 or 2     Binge frequency: Less than monthly     Comment: social use     Drug use: No    Sexual activity: Yes     Partners: Male     Birth control/protection: Male Sterilization     Comment: partner had vasectomy    Lifestyle    Physical activity     Days per week: Not on file     Minutes per session: Not on file    Stress: Not on file   Relationships    Social connections     Talks on phone: Not on file     Gets together: Not on file     Attends [employer] service: Not on file     Active member of club or organization: Not on file     Attends meetings of clubs or organizations: Not on file     Relationship status: Not on file    Intimate partner violence     Fear of current or ex partner: Not on file     Emotionally abused: Not on file     Physically abused: Not on file     Forced sexual activity: Not on file   Other Topics Concern    Not on file   Social History Narrative    1 cup of coffee daily     exercising regularly      Family History   Problem Relation Age of Onset    Heart disease Mother     Hypertension Mother     Paget's disease of bone Mother 76    Breast cancer Mother 68    Diabetes Paternal Grandmother     Anemia Family     Arthritis Family     Depression Family     Anxiety disorder Family     Diabetes Family     Lung cancer Family     Migraines Family     Paget's disease of bone Family     Thyroid disease Family     Varicose Veins Family     Prostate cancer Father 77    Esophageal cancer Maternal Grandfather     Lung cancer Paternal Grandfather     No Known Problems Maternal Aunt      Past Medical History:   Diagnosis Date    Anemia     External hemorrhoids     Hematuria     last assessed 12/17/13; resolved 6/27/14    Lyme disease 11/2004    Migraine     Varicose veins of both lower extremities         Review of Systems   Respiratory: Negative  Cardiovascular: Negative  Gastrointestinal: Negative for constipation and diarrhea  Genitourinary: Negative for difficulty urinating, pelvic pain, vaginal bleeding, vaginal discharge, itching or odor      O:  Blood pressure 110/62, height 5' 9" (1 753 m), weight 68 3 kg (150 lb 9 6 oz)  Patient appears well and is not in distress  Breasts are symmetrical without mass, tenderness, nipple discharge, skin changes or adenopathy  Abdomen is soft and nontender without masses  External genitals are normal without lesions or rashes  Urethral meatus and urethra are normal  Bladder is normal to palpation  Vagina is normal without discharge or bleeding  Cervix is normal without discharge or lesion  Uterus is normal, mobile, nontender without palpable mass  Adnexa are normal, nontender, without palpable mass  A:  Yearly exam, HPV infection     P:   Pap & HPV today  Mammo slip given   Colonoscopy due    Discussed possibility of LEEP if continued abnormal pap smears due to persistence of mild disease/HPV  RTO one year for yearly exam or sooner as needed

## 2021-06-05 LAB
HPV HR 12 DNA CVX QL NAA+PROBE: NEGATIVE
HPV16 DNA CVX QL NAA+PROBE: NEGATIVE
HPV18 DNA CVX QL NAA+PROBE: NEGATIVE

## 2021-06-09 LAB
LAB AP GYN PRIMARY INTERPRETATION: ABNORMAL
Lab: ABNORMAL
PATH INTERP SPEC-IMP: ABNORMAL

## 2021-06-10 ENCOUNTER — TELEPHONE (OUTPATIENT)
Dept: OBGYN CLINIC | Facility: CLINIC | Age: 51
End: 2021-06-10

## 2021-06-10 NOTE — TELEPHONE ENCOUNTER
----- Message from Tesfaye Mendiola MD sent at 6/10/2021  8:25 AM EDT -----  Pap is abnormal - so would advise Le schedule colposcopy    She has had prior colpo/abnormal     Thanks

## 2021-06-10 NOTE — TELEPHONE ENCOUNTER
Spoke to Le and reviewed msg from CT about need for colp and pt was confused because she saw results and thought it was ok  2019 both HPV and pap abnormal  2020 HPV+, pap was WNL  This one, HPV was neg and pap ASCUS    She just wants to clarify and make sure she really needs another colpo done   Told her I would relay msg to CT

## 2021-06-14 NOTE — TELEPHONE ENCOUNTER
Called pt - Informed of negative HPV, & if pt prefers she can just repeat pap/HPV in one year per most recent guidelines     Pt would like to wait & repeat both next year  appt already scheduled for 6/9/2022   - reminded pt of appt date & time

## 2021-06-14 NOTE — TELEPHONE ENCOUNTER
Since HPV was negative - if she would prefer to just repeat pap/HPV in 1 year that would be acceptable per the most recent guidelines    Thanks

## 2021-07-27 ENCOUNTER — HOSPITAL ENCOUNTER (OUTPATIENT)
Dept: MAMMOGRAPHY | Facility: MEDICAL CENTER | Age: 51
Discharge: HOME | End: 2021-07-27
Payer: COMMERCIAL

## 2021-07-27 VITALS — HEIGHT: 69 IN | BODY MASS INDEX: 22.22 KG/M2 | WEIGHT: 150 LBS

## 2021-07-27 DIAGNOSIS — Z12.31 ENCOUNTER FOR SCREENING MAMMOGRAM FOR MALIGNANT NEOPLASM OF BREAST: ICD-10-CM

## 2021-07-27 PROCEDURE — 77063 BREAST TOMOSYNTHESIS BI: CPT

## 2021-07-27 PROCEDURE — 77067 SCR MAMMO BI INCL CAD: CPT

## 2021-07-29 ENCOUNTER — OFFICE VISIT (OUTPATIENT)
Dept: FAMILY MEDICINE CLINIC | Facility: CLINIC | Age: 51
End: 2021-07-29
Payer: COMMERCIAL

## 2021-07-29 VITALS
SYSTOLIC BLOOD PRESSURE: 118 MMHG | TEMPERATURE: 99 F | HEART RATE: 70 BPM | HEIGHT: 69 IN | OXYGEN SATURATION: 98 % | BODY MASS INDEX: 22.18 KG/M2 | WEIGHT: 149.75 LBS | DIASTOLIC BLOOD PRESSURE: 80 MMHG

## 2021-07-29 DIAGNOSIS — Z13.0 SCREENING, ANEMIA, DEFICIENCY, IRON: ICD-10-CM

## 2021-07-29 DIAGNOSIS — N95.1 MENOPAUSAL SYMPTOMS: ICD-10-CM

## 2021-07-29 DIAGNOSIS — Z11.59 ENCOUNTER FOR HEPATITIS C SCREENING TEST FOR LOW RISK PATIENT: ICD-10-CM

## 2021-07-29 DIAGNOSIS — R00.2 PALPITATIONS: Primary | ICD-10-CM

## 2021-07-29 DIAGNOSIS — F41.0 PANIC ANXIETY SYNDROME: ICD-10-CM

## 2021-07-29 DIAGNOSIS — Z13.220 SCREENING FOR LIPID DISORDERS: ICD-10-CM

## 2021-07-29 PROBLEM — R63.5 WEIGHT GAIN: Status: RESOLVED | Noted: 2019-02-20 | Resolved: 2021-07-29

## 2021-07-29 PROCEDURE — 1036F TOBACCO NON-USER: CPT | Performed by: FAMILY MEDICINE

## 2021-07-29 PROCEDURE — 3008F BODY MASS INDEX DOCD: CPT | Performed by: FAMILY MEDICINE

## 2021-07-29 PROCEDURE — 99214 OFFICE O/P EST MOD 30 MIN: CPT | Performed by: FAMILY MEDICINE

## 2021-07-29 PROCEDURE — 3725F SCREEN DEPRESSION PERFORMED: CPT | Performed by: FAMILY MEDICINE

## 2021-07-29 RX ORDER — LORAZEPAM 0.5 MG/1
0.5 TABLET ORAL 2 TIMES DAILY
Qty: 30 TABLET | Refills: 0 | Status: SHIPPED | OUTPATIENT
Start: 2021-07-29

## 2021-07-29 NOTE — PROGRESS NOTES
Subjective:   Chief Complaint   Patient presents with    Palpitations     PT COMES IN TO DISCUSS PALPITATIONS BECOMING MORE FREQUENT OVER THE LAST SEVERAL MONTHS  PT NOTICED THE INCREASED FREQUENCY AFTER HER LAST COVID VACCINE  PT HAS HX OF PANIC ATTACKS   Patient ID: Dick Tripp is a 44 y o  female  Patient is here today because she has been having palpitations more frequently over the last few months  They are not happening almost daily   She does have quite a bit of stress but she is not necessarily generally anxious   She does not feel depressed   She does have lorazepam which she takes on occasion but not regularly   She actually needs more as her bottle as well over a year old  She is not sure she is in menopause, she is going to be 46, had an ablation of the uterus years ago so she has not had a period in years  She is having hot flashes at times  After 2 drinks gets hot flashes and palpitations -  Does not drink often but this never used to happen  But also happens not with alcohol  Father had afib   no chest pain or shortness of breath          The following portions of the patient's history were reviewed and updated as appropriate: allergies, current medications, past family history, past medical history, past social history, past surgical history and problem list     Review of Systems          Objective:  Vitals:    07/29/21 1601   BP: 118/80   Pulse: 70   Temp: 99 °F (37 2 °C)   SpO2: 98%   Weight: 67 9 kg (149 lb 12 oz)   Height: 5' 9" (1 753 m)      Physical Exam  Constitutional:       General: She is not in acute distress  Appearance: Normal appearance  She is not ill-appearing  Cardiovascular:      Rate and Rhythm: Normal rate and regular rhythm  Heart sounds: No murmur heard  Skin:     General: Skin is warm and dry  Findings: No erythema or rash  Neurological:      General: No focal deficit present        Mental Status: She is alert and oriented to person, place, and time  Cranial Nerves: No cranial nerve deficit  Gait: Gait normal    Psychiatric:         Mood and Affect: Mood normal          Behavior: Behavior normal          Thought Content: Thought content normal          Judgment: Judgment normal            Assessment/Plan:    No problem-specific Assessment & Plan notes found for this encounter  I am going to start up having the patient do a Holter monitor to make sure we are not missing something  This may very well be simply related to menopause, though, I am going to order blood work including [address] and [address] since she has no idea if she is in menopause because of her uterine ablation  I am also ordering her routine labs to have done including a TSH to be sure there is nothing going on here causing the systemic symptoms  Diagnoses and all orders for this visit:    Palpitations  -     Holter monitor - 48 hour; Future  -     CBC and differential; Future  -     Comprehensive metabolic panel; Future  -     TSH, 3rd generation with Free T4 reflex; Future  -     CBC and differential  -     Comprehensive metabolic panel  -     TSH, 3rd generation with Free T4 reflex    Panic anxiety syndrome  -     LORazepam (ATIVAN) 0 5 mg tablet; Take 1 tablet (0 5 mg total) by mouth 2 (two) times a day    Menopausal symptoms  -     FSH and LH; Future    Screening, anemia, deficiency, iron  -     CBC and differential; Future  -     CBC and differential    Screening for lipid disorders  -     Lipid Panel with Direct LDL reflex;  Future  -     Lipid Panel with Direct LDL reflex    Encounter for hepatitis C screening test for low risk patient  -     Hepatitis C Ab W/Refl To HCV RNA, Qn, PCR; Future  -     Hepatitis C Ab W/Refl To HCV RNA, Qn, PCR

## 2021-08-02 LAB
ALBUMIN SERPL-MCNC: 4.4 G/DL (ref 3.6–5.1)
ALBUMIN/GLOB SERPL: 1.8 (CALC) (ref 1–2.5)
ALP SERPL-CCNC: 47 U/L (ref 37–153)
ALT SERPL-CCNC: 9 U/L (ref 6–29)
AST SERPL-CCNC: 12 U/L (ref 10–35)
BASOPHILS # BLD AUTO: 20 CELLS/UL (ref 0–200)
BASOPHILS NFR BLD AUTO: 0.5 %
BILIRUB SERPL-MCNC: 0.5 MG/DL (ref 0.2–1.2)
BUN SERPL-MCNC: 18 MG/DL (ref 7–25)
BUN/CREAT SERPL: ABNORMAL (CALC) (ref 6–22)
CALCIUM SERPL-MCNC: 9.1 MG/DL (ref 8.6–10.4)
CHLORIDE SERPL-SCNC: 103 MMOL/L (ref 98–110)
CHOLEST SERPL-MCNC: 174 MG/DL
CHOLEST/HDLC SERPL: 2.2 (CALC)
CO2 SERPL-SCNC: 28 MMOL/L (ref 20–32)
CREAT SERPL-MCNC: 0.65 MG/DL (ref 0.5–1.05)
EOSINOPHIL # BLD AUTO: 80 CELLS/UL (ref 15–500)
EOSINOPHIL NFR BLD AUTO: 2 %
ERYTHROCYTE [DISTWIDTH] IN BLOOD BY AUTOMATED COUNT: 12.1 % (ref 11–15)
FSH SERPL-ACNC: 72.8 MIU/ML
GLOBULIN SER CALC-MCNC: 2.4 G/DL (CALC) (ref 1.9–3.7)
GLUCOSE SERPL-MCNC: 104 MG/DL (ref 65–99)
HCT VFR BLD AUTO: 38.4 % (ref 35–45)
HCV AB S/CO SERPL IA: 0.04
HCV AB SERPL QL IA: NORMAL
HDLC SERPL-MCNC: 78 MG/DL
HGB BLD-MCNC: 12.7 G/DL (ref 11.7–15.5)
LDLC SERPL CALC-MCNC: 82 MG/DL (CALC)
LH SERPL-ACNC: 51.8 MIU/ML
LYMPHOCYTES # BLD AUTO: 1416 CELLS/UL (ref 850–3900)
LYMPHOCYTES NFR BLD AUTO: 35.4 %
MCH RBC QN AUTO: 31.3 PG (ref 27–33)
MCHC RBC AUTO-ENTMCNC: 33.1 G/DL (ref 32–36)
MCV RBC AUTO: 94.6 FL (ref 80–100)
MONOCYTES # BLD AUTO: 324 CELLS/UL (ref 200–950)
MONOCYTES NFR BLD AUTO: 8.1 %
NEUTROPHILS # BLD AUTO: 2160 CELLS/UL (ref 1500–7800)
NEUTROPHILS NFR BLD AUTO: 54 %
NONHDLC SERPL-MCNC: 96 MG/DL (CALC)
PLATELET # BLD AUTO: 286 THOUSAND/UL (ref 140–400)
PMV BLD REES-ECKER: 10.1 FL (ref 7.5–12.5)
POTASSIUM SERPL-SCNC: 4.6 MMOL/L (ref 3.5–5.3)
PROT SERPL-MCNC: 6.8 G/DL (ref 6.1–8.1)
RBC # BLD AUTO: 4.06 MILLION/UL (ref 3.8–5.1)
SL AMB EGFR AFRICAN AMERICAN: 118 ML/MIN/1.73M2
SL AMB EGFR NON AFRICAN AMERICAN: 102 ML/MIN/1.73M2
SODIUM SERPL-SCNC: 140 MMOL/L (ref 135–146)
TRIGL SERPL-MCNC: 62 MG/DL
TSH SERPL-ACNC: 2.49 MIU/L
WBC # BLD AUTO: 4 THOUSAND/UL (ref 3.8–10.8)

## 2021-08-31 ENCOUNTER — HOSPITAL ENCOUNTER (OUTPATIENT)
Dept: NON INVASIVE DIAGNOSTICS | Facility: CLINIC | Age: 51
Discharge: HOME | End: 2021-08-31
Payer: COMMERCIAL

## 2021-08-31 DIAGNOSIS — R00.2 PALPITATIONS: ICD-10-CM

## 2021-08-31 PROCEDURE — 93226 XTRNL ECG REC<48 HR SCAN A/R: CPT

## 2021-08-31 PROCEDURE — 93225 XTRNL ECG REC<48 HRS REC: CPT

## 2021-09-03 PROCEDURE — 93227 XTRNL ECG REC<48 HR R&I: CPT | Performed by: INTERNAL MEDICINE

## 2021-10-08 ENCOUNTER — TELEPHONE (OUTPATIENT)
Dept: FAMILY MEDICINE CLINIC | Facility: CLINIC | Age: 51
End: 2021-10-08
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
## 2021-10-12 DIAGNOSIS — S00.06XA TICK BITE OF SCALP, INITIAL ENCOUNTER: Primary | ICD-10-CM

## 2021-10-15 ENCOUNTER — TELEPHONE (OUTPATIENT)
Dept: FAMILY MEDICINE CLINIC | Facility: CLINIC | Age: 51
End: 2021-10-15

## 2021-10-15 LAB — B BURGDOR AB SER IA-ACNC: <0.9 INDEX

## 2021-10-22 ENCOUNTER — HOSPITAL ENCOUNTER (EMERGENCY)
Facility: HOSPITAL | Age: 51
Discharge: HOME | End: 2021-10-22
Attending: EMERGENCY MEDICINE | Admitting: EMERGENCY MEDICINE
Payer: COMMERCIAL

## 2021-10-22 ENCOUNTER — APPOINTMENT (EMERGENCY)
Dept: RADIOLOGY | Facility: HOSPITAL | Age: 51
End: 2021-10-22
Payer: COMMERCIAL

## 2021-10-22 VITALS
OXYGEN SATURATION: 100 % | DIASTOLIC BLOOD PRESSURE: 85 MMHG | HEART RATE: 79 BPM | SYSTOLIC BLOOD PRESSURE: 134 MMHG | RESPIRATION RATE: 16 BRPM | TEMPERATURE: 98 F

## 2021-10-22 DIAGNOSIS — R00.2 PALPITATIONS: Primary | ICD-10-CM

## 2021-10-22 DIAGNOSIS — R20.0 NUMBNESS AND TINGLING IN LEFT ARM: ICD-10-CM

## 2021-10-22 DIAGNOSIS — R20.2 NUMBNESS AND TINGLING IN LEFT ARM: ICD-10-CM

## 2021-10-22 LAB
ANION GAP SERPL CALCULATED.3IONS-SCNC: 4 MMOL/L (ref 4–13)
ATRIAL RATE: 65 BPM
BASOPHILS # BLD AUTO: 0.02 THOUSANDS/ΜL (ref 0–0.1)
BASOPHILS NFR BLD AUTO: 1 % (ref 0–1)
BUN SERPL-MCNC: 18 MG/DL (ref 5–25)
CALCIUM SERPL-MCNC: 9 MG/DL (ref 8.3–10.1)
CHLORIDE SERPL-SCNC: 108 MMOL/L (ref 100–108)
CO2 SERPL-SCNC: 27 MMOL/L (ref 21–32)
CREAT SERPL-MCNC: 0.64 MG/DL (ref 0.6–1.3)
EOSINOPHIL # BLD AUTO: 0.04 THOUSAND/ΜL (ref 0–0.61)
EOSINOPHIL NFR BLD AUTO: 1 % (ref 0–6)
ERYTHROCYTE [DISTWIDTH] IN BLOOD BY AUTOMATED COUNT: 12.1 % (ref 11.6–15.1)
GFR SERPL CREATININE-BSD FRML MDRD: 102 ML/MIN/1.73SQ M
GLUCOSE SERPL-MCNC: 91 MG/DL (ref 65–140)
HCT VFR BLD AUTO: 37.6 % (ref 34.8–46.1)
HGB BLD-MCNC: 12.4 G/DL (ref 11.5–15.4)
IMM GRANULOCYTES # BLD AUTO: 0.01 THOUSAND/UL (ref 0–0.2)
IMM GRANULOCYTES NFR BLD AUTO: 0 % (ref 0–2)
LYMPHOCYTES # BLD AUTO: 1.1 THOUSANDS/ΜL (ref 0.6–4.47)
LYMPHOCYTES NFR BLD AUTO: 26 % (ref 14–44)
MCH RBC QN AUTO: 31.6 PG (ref 26.8–34.3)
MCHC RBC AUTO-ENTMCNC: 33 G/DL (ref 31.4–37.4)
MCV RBC AUTO: 96 FL (ref 82–98)
MONOCYTES # BLD AUTO: 0.23 THOUSAND/ΜL (ref 0.17–1.22)
MONOCYTES NFR BLD AUTO: 5 % (ref 4–12)
NEUTROPHILS # BLD AUTO: 2.88 THOUSANDS/ΜL (ref 1.85–7.62)
NEUTS SEG NFR BLD AUTO: 67 % (ref 43–75)
NRBC BLD AUTO-RTO: 0 /100 WBCS
P AXIS: 63 DEGREES
PLATELET # BLD AUTO: 258 THOUSANDS/UL (ref 149–390)
PMV BLD AUTO: 9.9 FL (ref 8.9–12.7)
POTASSIUM SERPL-SCNC: 3.8 MMOL/L (ref 3.5–5.3)
PR INTERVAL: 148 MS
QRS AXIS: 73 DEGREES
QRSD INTERVAL: 92 MS
QT INTERVAL: 406 MS
QTC INTERVAL: 422 MS
RBC # BLD AUTO: 3.93 MILLION/UL (ref 3.81–5.12)
SODIUM SERPL-SCNC: 139 MMOL/L (ref 136–145)
T WAVE AXIS: 80 DEGREES
TROPONIN I SERPL-MCNC: <0.02 NG/ML
VENTRICULAR RATE: 65 BPM
WBC # BLD AUTO: 4.28 THOUSAND/UL (ref 4.31–10.16)

## 2021-10-22 PROCEDURE — 84484 ASSAY OF TROPONIN QUANT: CPT | Performed by: STUDENT IN AN ORGANIZED HEALTH CARE EDUCATION/TRAINING PROGRAM

## 2021-10-22 PROCEDURE — 80048 BASIC METABOLIC PNL TOTAL CA: CPT | Performed by: STUDENT IN AN ORGANIZED HEALTH CARE EDUCATION/TRAINING PROGRAM

## 2021-10-22 PROCEDURE — 93010 ELECTROCARDIOGRAM REPORT: CPT | Performed by: INTERNAL MEDICINE

## 2021-10-22 PROCEDURE — 99285 EMERGENCY DEPT VISIT HI MDM: CPT

## 2021-10-22 PROCEDURE — 93005 ELECTROCARDIOGRAM TRACING: CPT

## 2021-10-22 PROCEDURE — 71046 X-RAY EXAM CHEST 2 VIEWS: CPT

## 2021-10-22 PROCEDURE — 99284 EMERGENCY DEPT VISIT MOD MDM: CPT | Performed by: EMERGENCY MEDICINE

## 2021-10-22 PROCEDURE — 85025 COMPLETE CBC W/AUTO DIFF WBC: CPT | Performed by: STUDENT IN AN ORGANIZED HEALTH CARE EDUCATION/TRAINING PROGRAM

## 2021-10-22 PROCEDURE — 36415 COLL VENOUS BLD VENIPUNCTURE: CPT | Performed by: STUDENT IN AN ORGANIZED HEALTH CARE EDUCATION/TRAINING PROGRAM

## 2021-10-25 ENCOUNTER — OFFICE VISIT (OUTPATIENT)
Dept: FAMILY MEDICINE CLINIC | Facility: CLINIC | Age: 51
End: 2021-10-25
Payer: COMMERCIAL

## 2021-10-25 VITALS
DIASTOLIC BLOOD PRESSURE: 76 MMHG | BODY MASS INDEX: 22.81 KG/M2 | OXYGEN SATURATION: 98 % | HEART RATE: 73 BPM | SYSTOLIC BLOOD PRESSURE: 122 MMHG | HEIGHT: 69 IN | WEIGHT: 154 LBS | TEMPERATURE: 98.1 F

## 2021-10-25 DIAGNOSIS — M54.42 ACUTE LEFT-SIDED LOW BACK PAIN WITH LEFT-SIDED SCIATICA: Primary | ICD-10-CM

## 2021-10-25 PROCEDURE — 99213 OFFICE O/P EST LOW 20 MIN: CPT | Performed by: NURSE PRACTITIONER

## 2021-10-25 PROCEDURE — 3725F SCREEN DEPRESSION PERFORMED: CPT | Performed by: NURSE PRACTITIONER

## 2021-10-25 PROCEDURE — 1036F TOBACCO NON-USER: CPT | Performed by: NURSE PRACTITIONER

## 2021-10-25 PROCEDURE — 3008F BODY MASS INDEX DOCD: CPT | Performed by: NURSE PRACTITIONER

## 2021-10-25 RX ORDER — PREDNISONE 20 MG/1
TABLET ORAL
Qty: 15 TABLET | Refills: 0 | Status: SHIPPED | OUTPATIENT
Start: 2021-10-25 | End: 2022-01-21 | Stop reason: ALTCHOICE

## 2021-11-10 ENCOUNTER — OFFICE VISIT (OUTPATIENT)
Dept: CARDIOLOGY CLINIC | Facility: CLINIC | Age: 51
End: 2021-11-10
Payer: COMMERCIAL

## 2021-11-10 ENCOUNTER — TELEPHONE (OUTPATIENT)
Dept: CARDIOLOGY CLINIC | Facility: CLINIC | Age: 51
End: 2021-11-10

## 2021-11-10 VITALS
SYSTOLIC BLOOD PRESSURE: 126 MMHG | HEIGHT: 69 IN | OXYGEN SATURATION: 99 % | HEART RATE: 82 BPM | WEIGHT: 156 LBS | DIASTOLIC BLOOD PRESSURE: 72 MMHG | BODY MASS INDEX: 23.11 KG/M2

## 2021-11-10 DIAGNOSIS — R00.2 PALPITATIONS: Primary | ICD-10-CM

## 2021-11-10 PROCEDURE — 99204 OFFICE O/P NEW MOD 45 MIN: CPT | Performed by: INTERNAL MEDICINE

## 2021-11-10 PROCEDURE — 3008F BODY MASS INDEX DOCD: CPT | Performed by: INTERNAL MEDICINE

## 2021-11-10 PROCEDURE — 1036F TOBACCO NON-USER: CPT | Performed by: INTERNAL MEDICINE

## 2021-11-11 ENCOUNTER — TELEPHONE (OUTPATIENT)
Dept: FAMILY MEDICINE CLINIC | Facility: CLINIC | Age: 51
End: 2021-11-11

## 2021-11-11 DIAGNOSIS — M54.42 ACUTE BILATERAL LOW BACK PAIN WITH LEFT-SIDED SCIATICA: Primary | ICD-10-CM

## 2022-01-03 ENCOUNTER — CLINICAL SUPPORT (OUTPATIENT)
Dept: CARDIOLOGY CLINIC | Facility: CLINIC | Age: 52
End: 2022-01-03
Payer: COMMERCIAL

## 2022-01-03 DIAGNOSIS — R00.2 PALPITATIONS: ICD-10-CM

## 2022-01-03 PROCEDURE — 93248 EXT ECG>7D<15D REV&INTERPJ: CPT | Performed by: INTERNAL MEDICINE

## 2022-01-21 ENCOUNTER — OFFICE VISIT (OUTPATIENT)
Dept: CARDIOLOGY CLINIC | Facility: CLINIC | Age: 52
End: 2022-01-21

## 2022-01-21 VITALS
HEART RATE: 68 BPM | WEIGHT: 159.1 LBS | DIASTOLIC BLOOD PRESSURE: 82 MMHG | OXYGEN SATURATION: 99 % | BODY MASS INDEX: 23.49 KG/M2 | SYSTOLIC BLOOD PRESSURE: 116 MMHG

## 2022-01-21 DIAGNOSIS — R00.2 PALPITATIONS: Primary | ICD-10-CM

## 2022-01-21 PROCEDURE — 99214 OFFICE O/P EST MOD 30 MIN: CPT | Performed by: INTERNAL MEDICINE

## 2022-01-21 NOTE — PATIENT INSTRUCTIONS
Heart Palpitations   AMBULATORY CARE:   Heart palpitations  are feelings that your heart races, jumps, throbs, or flutters  You may feel extra beats, no beats for a short time, or skipped beats  You may have these feelings in your chest, throat, or neck  They may happen when you are sitting, standing, or lying  Heart palpitations may be frightening, but are usually not caused by a serious problem  Call 911 or have someone else call for any of the following:   · You have any of the following signs of a heart attack:      ? Squeezing, pressure, or pain in your chest    ? You may  also have any of the following:     § Discomfort or pain in your back, neck, jaw, stomach, or arm    § Shortness of breath    § Nausea or vomiting    § Lightheadedness or a sudden cold sweat    · You have any of the following signs of a stroke:      ? Numbness or drooping on one side of your face     ? Weakness in an arm or leg    ? Confusion or difficulty speaking    ? Dizziness, a severe headache, or vision loss    · You faint or lose consciousness  Seek care immediately if:   · Your palpitations happen more often or last longer than usual      · You have palpitations and shortness of breath, nausea, sweating, or dizziness  Contact your healthcare provider if:   · You have questions or concerns about your condition or care  Follow up with your healthcare provider as directed: You may need to follow up with a cardiologist  Genesis Morris may need tests to check for heart problems that cause palpitations  Write down your questions so you remember to ask them during your visits  Treatment for heart palpitations  is usually not needed  Your healthcare provider may stop or change your medicines if they are causing your palpitations  Conditions that cause palpitations, such as an abnormal heartbeat, will be treated  Keep a record:  Write down when your palpitations start and stop, what you were doing when they started, and your symptoms  Keep track of what you ate or drank within a few hours of your palpitations  Include anything that seemed to help your symptoms, such as lying down or holding your breath  This record will help you and your healthcare provider learn what triggers your palpitations  Bring this record with you to your follow up visits  Help prevent heart palpitations:   · Manage stress and anxiety  Find ways to relax such as listening to music, meditating, or doing yoga  Exercise can also help decrease stress and anxiety  Talk to someone you trust about your stress or anxiety  You can also talk to a therapist      · Get plenty of sleep every night  Ask your healthcare provider how much sleep you need each night  · Do not drink caffeine or alcohol  Caffeine and alcohol can make your palpitations worse  Caffeine is found in soda, coffee, tea, chocolate, and drinks that increase your energy  · Do not smoke  Nicotine and other chemicals in cigarettes and cigars may damage your heart and blood vessels  Ask your healthcare provider for information if you currently smoke and need help to quit  E-cigarettes or smokeless tobacco still contain nicotine  Talk to your healthcare provider before you use these products  · Do not use illegal drugs  Talk to your healthcare provider if you use illegal drugs and want help to quit  © Copyright [employer] 2021 Information is for End User's use only and may not be sold, redistributed or otherwise used for commercial purposes  All illustrations and images included in CareNotes® are the copyrighted property of A D A M [employer] Inc  or Sabra Giron  The above information is an  only  It is not intended as medical advice for individual conditions or treatments  Talk to your doctor, nurse or pharmacist before following any medical regimen to see if it is safe and effective for you

## 2022-01-21 NOTE — PROGRESS NOTES
Cardiology Consultation     Dat Deal  [id number]  1970  HEART & VASCULAR [facility] [place] [address] [address] [id number]    1  Palpitations        Chief Complaint   Patient presents with    Follow-up     4 week OV post zio; no cardiac complaints      HPI: Patient is here for cardiac evaluation of palpitations  Ongoing for the past 6 months  When she was wearing Holter, she was having some symptoms, but not as much as she's had  Symptoms tend to be worse at night  Any alcohol intake causes her to feel palpitations - feels like heart racing  No significant change to symptoms since last visit  No reported chest pain, shortness of breath, lightheadedness, syncope, LE edema, orthopnea, PND, or significant weight changes  Patient remains active without any increased fatigue out of the ordinary        Patient Active Problem List   Diagnosis    Rectocele    Dense breast    ASCUS with positive high risk HPV cervical    Vitamin D deficiency    History of nephrolithiasis    Panic anxiety syndrome    Palpitations    Acute bilateral low back pain with left-sided sciatica     Past Medical History:   Diagnosis Date    Anemia     External hemorrhoids     Hematuria     last assessed 12/17/13; resolved 6/27/14    Lyme disease 11/2004    Migraine     Varicose veins of both lower extremities      Social History     Socioeconomic History    Marital status: /Civil Union     Spouse name: Not on file    Number of children: Not on file    Years of education: Not on file    Highest education level: Not on file   Occupational History    Not on file   Tobacco Use    Smoking status: Never Smoker    Smokeless tobacco: Never Used   Vaping Use    Vaping Use: Never used   Substance and Sexual Activity    Alcohol use: Yes     Comment: social use     Drug use: No    Sexual activity: Yes     Partners: Male     Birth control/protection: Male Sterilization     Comment: partner had vasectomy    Other Topics Concern    Not on file   Social History Narrative    1 cup of coffee daily     exercising regularly      Social Determinants of Health     Financial Resource Strain: Not on file   Food Insecurity: Not on file   Transportation Needs: Not on file   Physical Activity: Not on file   Stress: Not on file   Social Connections: Not on file   Intimate Partner Violence: Not on file   Housing Stability: Not on file      Family History   Problem Relation Age of Onset    Hyperlipidemia Mother     Heart disease Mother     Hypertension Mother     Paget's disease of bone Mother 76    Breast cancer Mother 68    Stroke Father     Hypertension Father     Prostate cancer Father 77    Esophageal cancer Maternal Grandfather     Diabetes Paternal Grandmother     Lung cancer Paternal Grandfather     No Known Problems Maternal Aunt     Anemia Family     Arthritis Family     Depression Family     Anxiety disorder Family     Diabetes Family     Lung cancer Family     Migraines Family     Paget's disease of bone Family     Thyroid disease Family     Varicose Veins Family      Past Surgical History:   Procedure Laterality Date    DILATION AND CURETTAGE, DIAGNOSTIC / THERAPEUTIC      ENDOMETRIAL BIOPSY  10/21/2008    by suction    HEMORRHOID SURGERY      HYSTEROSCOPY W/ ENDOMETRIAL ABLATION  05/24/2010    POLYPECTOMY      enteroscopic       Current Outpatient Medications:     Calcium Carb-Cholecalciferol (CALCIUM + D3) 600-200 MG-UNIT TABS, Take by mouth, Disp: , Rfl:     LORazepam (ATIVAN) 0 5 mg tablet, Take 1 tablet (0 5 mg total) by mouth 2 (two) times a day (Patient taking differently: Take 0 5 mg by mouth as needed  ), Disp: 30 tablet, Rfl: 0    MAGNESIUM GLUCONATE PO, Take 400 mg by mouth daily, Disp: , Rfl:   Allergies   Allergen Reactions    Pollen Extract      Vitals:    01/21/22 1557   BP: 116/82   BP Location: Right arm   Patient Position: Sitting   Cuff Size: Standard   Pulse: 68   SpO2: 99%   Weight: 72 2 kg (159 lb 1 6 oz)       Labs:  Admission on 10/22/2021, Discharged on 10/22/2021   Component Date Value    WBC 10/22/2021 4 28*    RBC 10/22/2021 3 93     Hemoglobin 10/22/2021 12 4     Hematocrit 10/22/2021 37 6     MCV 10/22/2021 96     MCH 10/22/2021 31 6     MCHC 10/22/2021 33 0     RDW 10/22/2021 12 1     MPV 10/22/2021 9 9     Platelets 90/24/5817 258     nRBC 10/22/2021 0     Neutrophils Relative 10/22/2021 67     Immat GRANS % 10/22/2021 0     Lymphocytes Relative 10/22/2021 26     Monocytes Relative 10/22/2021 5     Eosinophils Relative 10/22/2021 1     Basophils Relative 10/22/2021 1     Neutrophils Absolute 10/22/2021 2 88     Immature Grans Absolute 10/22/2021 0 01     Lymphocytes Absolute 10/22/2021 1 10     Monocytes Absolute 10/22/2021 0 23     Eosinophils Absolute 10/22/2021 0 04     Basophils Absolute 10/22/2021 0 02     Sodium 10/22/2021 139     Potassium 10/22/2021 3 8     Chloride 10/22/2021 108     CO2 10/22/2021 27     ANION GAP 10/22/2021 4     BUN 10/22/2021 18     Creatinine 10/22/2021 0 64     Glucose 10/22/2021 91     Calcium 10/22/2021 9 0     eGFR 10/22/2021 102     Troponin I 10/22/2021 <0 02     Ventricular Rate 10/22/2021 65     Atrial Rate 10/22/2021 65     NY Interval 10/22/2021 148     QRSD Interval 10/22/2021 92     QT Interval 10/22/2021 406     QTC Interval 10/22/2021 422     P Axis 10/22/2021 63     QRS Axis 10/22/2021 73     T Wave Axis 10/22/2021 80    Orders Only on 10/12/2021   Component Date Value    SL AMB LYME AB SCREEN 10/14/2021 <0 90    Office Visit on 07/29/2021   Component Date Value    White Blood Cell Count 08/02/2021 4 0     Red Blood Cell Count 08/02/2021 4 06     Hemoglobin 08/02/2021 12 7     HCT 08/02/2021 38 4     MCV 08/02/2021 94 6     MCH 08/02/2021 31 3     MCHC 08/02/2021 33 1     RDW 08/02/2021 12 1  Platelet Count 26/32/0422 286     SL AMB MPV 08/02/2021 10 1     Neutrophils (Absolute) 08/02/2021 2,160     Lymphocytes (Absolute) 08/02/2021 1,416     Monocytes (Absolute) 08/02/2021 324     Eosinophils (Absolute) 08/02/2021 80     Basophils ABS 08/02/2021 20     Neutrophils 08/02/2021 54     Lymphocytes 08/02/2021 35 4     Monocytes 08/02/2021 8 1     Eosinophils 08/02/2021 2 0     Basophils PCT 08/02/2021 0 5     Glucose, Random 08/02/2021 104*    BUN 08/02/2021 18     Creatinine 08/02/2021 0 65     eGFR Non  08/02/2021 102     eGFR  08/02/2021 118     SL AMB BUN/CREATININE RA* 97/88/2106 NOT APPLICABLE     Sodium 79/86/1695 140     Potassium 08/02/2021 4 6     Chloride 08/02/2021 103     CO2 08/02/2021 28     Calcium 08/02/2021 9 1     Protein, Total 08/02/2021 6 8     Albumin 08/02/2021 4 4     Globulin 08/02/2021 2 4     Albumin/Globulin Ratio 08/02/2021 1 8     TOTAL BILIRUBIN 08/02/2021 0 5     Alkaline Phosphatase 08/02/2021 47     AST 08/02/2021 12     ALT 08/02/2021 9     TSH W/RFX TO FREE T4 08/02/2021 2 49     Total Cholesterol 08/02/2021 174     HDL 08/02/2021 78     Triglycerides 08/02/2021 62     LDL Calculated 08/02/2021 82     Chol HDLC Ratio 08/02/2021 2 2     Non-HDL Cholesterol 08/02/2021 96     HEP C AB 08/02/2021 NON-REACTIVE     Signal to Cut-Off 08/02/2021 0 04     FSH 08/02/2021 72 8     Luteinizing Hormone (LH) 08/02/2021 51 8      Lab Results   Component Value Date    CHOL 153 11/13/2015    TRIG 62 08/02/2021    HDL 78 08/02/2021     Imaging: XR chest 2 views    Result Date: 10/22/2021  Narrative: CHEST INDICATION:   numbness  Palpitations  COMPARISON:  None EXAM PERFORMED/VIEWS:  XR CHEST PA & LATERAL FINDINGS: Cardiomediastinal silhouette appears unremarkable  Subpleural scarring noted in the lung apices  The lungs are clear  No pneumothorax or pleural effusion   Osseous structures appear within normal limits for patient age  Impression: No acute cardiopulmonary disease  Workstation performed: [id number]       Review of Systems:  Review of Systems   Constitutional: Negative for activity change, appetite change, chills, diaphoresis, fatigue and unexpected weight change  HENT: Negative for hearing loss, nosebleeds and sore throat  Eyes: Negative for photophobia and visual disturbance  Respiratory: Negative for cough, chest tightness, shortness of breath and wheezing  Cardiovascular: Positive for palpitations  Negative for chest pain and leg swelling  Gastrointestinal: Negative for abdominal pain, diarrhea, nausea and vomiting  Endocrine: Negative for polyuria  Genitourinary: Negative for dysuria, frequency and hematuria  Musculoskeletal: Negative for arthralgias, back pain, gait problem and neck pain  Skin: Negative for pallor and rash  Neurological: Negative for dizziness, syncope and headaches  Hematological: Does not bruise/bleed easily  Psychiatric/Behavioral: Negative for behavioral problems and confusion  Physical Exam:  Physical Exam  Vitals reviewed  Constitutional:       Appearance: She is well-developed  She is not diaphoretic  HENT:      Head: Normocephalic and atraumatic  Nose: Nose normal    Eyes:      General: No scleral icterus  Pupils: Pupils are equal, round, and reactive to light  Neck:      Vascular: No JVD  Cardiovascular:      Rate and Rhythm: Normal rate and regular rhythm  Heart sounds: Normal heart sounds  No murmur heard  No friction rub  No gallop  Pulmonary:      Effort: Pulmonary effort is normal  No respiratory distress  Breath sounds: Normal breath sounds  No wheezing or rales  Abdominal:      General: Bowel sounds are normal  There is no distension  Palpations: Abdomen is soft  Tenderness: There is no abdominal tenderness  Musculoskeletal:         General: No deformity  Normal range of motion        Cervical back: Normal range of motion and neck supple  Skin:     General: Skin is warm and dry  Findings: No rash  Neurological:      Mental Status: She is alert and oriented to person, place, and time  Cranial Nerves: No cranial nerve deficit  Psychiatric:         Behavior: Behavior normal        Blood pressure 116/82, pulse 68, weight 72 2 kg (159 lb 1 6 oz), SpO2 99 %  Discussion/Summary:  Palpitations: unclear etiology  Normal baseline ECG  She had an echo that ruled out structural heart disease  We also checked a Zio patch monitor to rule out arrhythmias - recorded symptoms are not as strong as usual   This revealed no significant arrhythmias with symptoms

## 2022-06-09 ENCOUNTER — ANNUAL EXAM (OUTPATIENT)
Dept: OBGYN CLINIC | Facility: CLINIC | Age: 52
End: 2022-06-09
Payer: COMMERCIAL

## 2022-06-09 VITALS
BODY MASS INDEX: 24.4 KG/M2 | DIASTOLIC BLOOD PRESSURE: 62 MMHG | SYSTOLIC BLOOD PRESSURE: 112 MMHG | WEIGHT: 161 LBS | HEIGHT: 68 IN

## 2022-06-09 DIAGNOSIS — Z11.51 SCREENING FOR HPV (HUMAN PAPILLOMAVIRUS): ICD-10-CM

## 2022-06-09 DIAGNOSIS — Z12.11 ENCOUNTER FOR SCREENING FOR MALIGNANT NEOPLASM OF COLON: ICD-10-CM

## 2022-06-09 DIAGNOSIS — Z01.419 ENCOUNTER FOR GYNECOLOGICAL EXAMINATION (GENERAL) (ROUTINE) WITHOUT ABNORMAL FINDINGS: Primary | ICD-10-CM

## 2022-06-09 DIAGNOSIS — Z12.31 ENCOUNTER FOR SCREENING MAMMOGRAM FOR MALIGNANT NEOPLASM OF BREAST: ICD-10-CM

## 2022-06-09 PROCEDURE — G0476 HPV COMBO ASSAY CA SCREEN: HCPCS | Performed by: OBSTETRICS & GYNECOLOGY

## 2022-06-09 PROCEDURE — G0145 SCR C/V CYTO,THINLAYER,RESCR: HCPCS | Performed by: OBSTETRICS & GYNECOLOGY

## 2022-06-09 PROCEDURE — S0612 ANNUAL GYNECOLOGICAL EXAMINA: HCPCS | Performed by: OBSTETRICS & GYNECOLOGY

## 2022-06-09 NOTE — PROGRESS NOTES
Miguel Angel Hernandez   1970    CC:  Yearly exam    S:  46 y o  female here for yearly exam  She is postmenopausal and has had no vaginal bleeding  She denies vaginal discharge, itching, odor or dryness  Has been having some hot flashes/night sweats - but finds these are improved with a healthy diet/ eating  Unhappy with weight gain/inability to lose  Sexual activity: She is sexually active without pain, bleeding  She does note dryness with intercourse, does use lubricants - this is sufficient   & monogamous - they celebrated 25yrs this yr! She does not report urinary incontinence, urinary concerns, bowel problems, breast concerns  Last Pap:   2/20/19 - ASCUS, other HR HPV positive, colpo benign  6/9/20 - NILM, other HR HPV positive, colpo benign  6/3/21 - ASCUS, HPV negative    Last Mammo: 7/27/21 - [facility] 1  Last Colonoscopy:  FIT negative 11/2021    We reviewed ASCCP guidelines for Pap testing  Family hx of breast cancer:  Mother  Family hx of ovarian cancer: no  Family hx of colon cancer: no      Current Outpatient Medications:     Calcium Carb-Cholecalciferol (CALCIUM + D3) 600-200 MG-UNIT TABS, Take by mouth, Disp: , Rfl:     LORazepam (ATIVAN) 0 5 mg tablet, Take 1 tablet (0 5 mg total) by mouth 2 (two) times a day (Patient taking differently: Take 0 5 mg by mouth as needed), Disp: 30 tablet, Rfl: 0    MAGNESIUM GLUCONATE PO, Take 400 mg by mouth daily, Disp: , Rfl:   Patient Active Problem List   Diagnosis    Rectocele    Dense breast    ASCUS with positive high risk HPV cervical    Vitamin D deficiency    History of nephrolithiasis    Panic anxiety syndrome    Palpitations    Acute bilateral low back pain with left-sided sciatica     Family History   Problem Relation Age of Onset    Hyperlipidemia Mother     Heart disease Mother     Hypertension Mother     Paget's disease of bone Mother 76    Breast cancer Mother 68    Cancer Mother     Thyroid disease Mother     Stroke Father     Hypertension Father     Prostate cancer Father 77    Cancer Father     Thyroid disease Father     Esophageal cancer Maternal Grandfather     Cancer Maternal Grandfather     Diabetes Paternal Grandmother     Lung cancer Paternal Grandfather     Cancer Paternal Grandfather     No Known Problems Maternal Aunt     Anemia Family     Arthritis Family     Depression Family     Anxiety disorder Family     Diabetes Family     Lung cancer Family     Migraines Family     Paget's disease of bone Family     Thyroid disease Family     Varicose Veins Family      Past Medical History:   Diagnosis Date    Anemia     External hemorrhoids     Hematuria     last assessed 12/17/13; resolved 6/27/14    Kidney stone     Lyme disease 11/2004    Migraine     Varicose veins of both lower extremities         Review of Systems   Respiratory: Negative  Cardiovascular: Negative  Gastrointestinal: Negative for constipation and diarrhea  Genitourinary: Negative for difficulty urinating, pelvic pain, vaginal bleeding, vaginal discharge, itching or odor  O:  Height 5' 8" (1 727 m), weight 73 kg (161 lb)  Patient appears well and is not in distress  Breasts are symmetrical without mass, tenderness, nipple discharge, skin changes or adenopathy  Abdomen is soft and nontender without masses  External genitals are normal without lesions or rashes  Urethral meatus and urethra are normal  Bladder is normal to palpation  Vagina is normal without discharge or bleeding, generalized atrophic changes present   Cervix is normal without discharge or lesion  Uterus is normal, mobile, nontender without palpable mass  Adnexa are normal, nontender, without palpable mass       A:  Yearly exam      P:   Pap & HPV today  Mammo ordered   Colon Cancer Screening up to date, colonoscopy encouraged      Reviewed considerations of menopause, to call with any postmenopausal bleeding or other concerns  RTO one year for yearly exam or sooner as needed

## 2022-06-15 ENCOUNTER — PATIENT MESSAGE (OUTPATIENT)
Dept: OBGYN CLINIC | Facility: CLINIC | Age: 52
End: 2022-06-15

## 2022-06-15 ENCOUNTER — TELEPHONE (OUTPATIENT)
Dept: OBGYN CLINIC | Facility: CLINIC | Age: 52
End: 2022-06-15

## 2022-06-15 DIAGNOSIS — N95.1 VASOMOTOR SYMPTOMS DUE TO MENOPAUSE: Primary | ICD-10-CM

## 2022-06-15 LAB
LAB AP GYN PRIMARY INTERPRETATION: NORMAL
Lab: NORMAL

## 2022-06-15 NOTE — TELEPHONE ENCOUNTER
----- Message from Dustin Madrid sent at 6/15/2022  3:06 PM EDT -----  Regarding: Pap and HPV  From what Im reading, my Pap is okay and also negative for HPV  I assume Im good then until next year?

## 2022-09-09 ENCOUNTER — HOSPITAL ENCOUNTER (OUTPATIENT)
Dept: MAMMOGRAPHY | Facility: HOSPITAL | Age: 52
Discharge: HOME | End: 2022-09-09
Attending: OBSTETRICS & GYNECOLOGY
Payer: COMMERCIAL

## 2022-09-09 VITALS — BODY MASS INDEX: 24.39 KG/M2 | WEIGHT: 160.94 LBS | HEIGHT: 68 IN

## 2022-09-09 DIAGNOSIS — Z12.31 ENCOUNTER FOR SCREENING MAMMOGRAM FOR MALIGNANT NEOPLASM OF BREAST: ICD-10-CM

## 2022-09-09 PROCEDURE — 77067 SCR MAMMO BI INCL CAD: CPT

## 2022-09-09 PROCEDURE — 77063 BREAST TOMOSYNTHESIS BI: CPT

## 2022-10-13 ENCOUNTER — OFFICE VISIT (OUTPATIENT)
Dept: FAMILY MEDICINE CLINIC | Facility: HOSPITAL | Age: 52
End: 2022-10-13
Payer: COMMERCIAL

## 2022-10-13 VITALS
BODY MASS INDEX: 24.68 KG/M2 | WEIGHT: 166.6 LBS | SYSTOLIC BLOOD PRESSURE: 116 MMHG | TEMPERATURE: 98.4 F | HEIGHT: 69 IN | DIASTOLIC BLOOD PRESSURE: 82 MMHG | HEART RATE: 84 BPM

## 2022-10-13 DIAGNOSIS — Z83.71 FAMILY HISTORY OF POLYPS IN THE COLON: ICD-10-CM

## 2022-10-13 DIAGNOSIS — Z12.11 ENCOUNTER FOR SCREENING COLONOSCOPY: ICD-10-CM

## 2022-10-13 DIAGNOSIS — Z00.00 ANNUAL PHYSICAL EXAM: Primary | ICD-10-CM

## 2022-10-13 DIAGNOSIS — R73.9 HYPERGLYCEMIA: ICD-10-CM

## 2022-10-13 DIAGNOSIS — Z23 ENCOUNTER FOR IMMUNIZATION: ICD-10-CM

## 2022-10-13 DIAGNOSIS — F41.0 PANIC ANXIETY SYNDROME: ICD-10-CM

## 2022-10-13 DIAGNOSIS — R09.89 GLOBUS SENSATION: ICD-10-CM

## 2022-10-13 PROBLEM — M54.42 ACUTE BILATERAL LOW BACK PAIN WITH LEFT-SIDED SCIATICA: Status: RESOLVED | Noted: 2021-11-11 | Resolved: 2022-10-13

## 2022-10-13 PROCEDURE — 99386 PREV VISIT NEW AGE 40-64: CPT | Performed by: INTERNAL MEDICINE

## 2022-10-13 PROCEDURE — 90471 IMMUNIZATION ADMIN: CPT

## 2022-10-13 PROCEDURE — 90750 HZV VACC RECOMBINANT IM: CPT

## 2022-10-13 RX ORDER — LORAZEPAM 0.5 MG/1
0.5 TABLET ORAL 2 TIMES DAILY PRN
Qty: 30 TABLET | Refills: 0 | Status: SHIPPED | OUTPATIENT
Start: 2022-10-13

## 2022-10-13 RX ORDER — OMEPRAZOLE 40 MG/1
40 CAPSULE, DELAYED RELEASE ORAL DAILY
Qty: 30 CAPSULE | Refills: 1 | Status: SHIPPED | OUTPATIENT
Start: 2022-10-13

## 2022-10-13 NOTE — PROGRESS NOTES
[facility] [address]     NAME: Jose Kennedy  AGE: 45 y o  SEX: female  : 1970     DATE: 10/13/2022     Assessment and Plan:     Problem List Items Addressed This Visit        Other    Panic anxiety syndrome     Lorazepam refilled upon request         Relevant Medications    LORazepam (ATIVAN) 0 5 mg tablet      Other Visit Diagnoses     Annual physical exam    -  Primary    Relevant Orders    Comprehensive metabolic panel    TSH, 3rd generation with Free T4 reflex    Hemoglobin A1C With EAG    Encounter for screening colonoscopy        Family history of polyps in the colon        Hyperglycemia        FBS elevated in IFG range, urged low sugar/carb diet and regular exercise, check FBS/A1C - will follow    Relevant Orders    Comprehensive metabolic panel    TSH, 3rd generation with Free T4 reflex    Hemoglobin A1C With EAG    Globus sensation        Will check TSH and swallow study, will start Omeprazole and Claritin daily, needs colo and likely EGD - number GI given, will follow, call with new/worse symp    Relevant Medications    omeprazole (PriLOSEC) 40 MG capsule    Other Relevant Orders    TSH, 3rd generation with Free T4 reflex    FL barium swallow ROUTINE esophagus    Ambulatory Referral to Gastroenterology    Encounter for immunization        Relevant Orders    Zoster Vaccine Recombinant IM (Completed)          Immunizations and preventive care screenings were discussed with patient today  Appropriate education was printed on patient's after visit summary  Counseling:  Alcohol/drug use: discussed moderation in alcohol intake, the recommendations for healthy alcohol use, and avoidance of illicit drug use  Dental Health: discussed importance of regular tooth brushing, flossing, and dental visits    Injury prevention: discussed safety/seat belts, safety helmets, smoke detectors, carbon dioxide detectors, and smoking near bedding or upholstery  · Exercise: the importance of regular exercise/physical activity was discussed  Recommend exercise 3-5 times per week for at least 30 minutes  · Cervical cancer screening: PAP 6/22  · Breast cancer screening: Mammo 9/22  · Colon cancer screening: Colon cancer screening options were reviewed and pros and cons reviewed - pt agreeable to colonoscopy and order placed       BW 8/21      Shingrix #1 given today    Deferring flu d/t above - will get at next appt      Return in about 4 weeks (around 11/10/2022) for Recheck  Chief Complaint:     Chief Complaint   Patient presents with   • Establish Care   • throat issue      History of Present Illness:     Adult Annual Physical   Patient here for a comprehensive physical exam  The patient reports problems - issues with swallowing  She feels "I have a lump in my throat" for approx a lump  She points to base of neck  She denies reflux/pyrosis/regurgitation of food/abd pain/N/V/blood in stool/black stools/unintentional wgt loss  She denies pain with swallowing or food getting stuck or coughing with eating/drinking  She has some postnasal drip intermittently  She uses Zyrtec as needed but has not used recently  Issues with intermittent anxiety  Uses Ativan as needed - primarily to help her relax and sleep  Diet and Physical Activity  · Diet/Nutrition: well balanced diet, limited junk food and limited fruits/vegetables  · Exercise: walking  · BMI reviewed     Depression Screening  PHQ-2/9 Depression Screening    Little interest or pleasure in doing things: 0 - not at all  Feeling down, depressed, or hopeless: 0 - not at all  PHQ-2 Score: 0  PHQ-2 Interpretation: Negative depression screen       General Health  · Sleep: sleeps well  · Hearing: normal - bilateral   · Vision: goes for regular eye exams, most recent eye exam <1 year ago and wears glasses  · Dental: regular dental visits and brushes teeth twice daily  /GYN Health  · Patient is: postmenopausal  · Last menstrual period: postmenopausal  · Contraceptive method: postmenopausal   · PAP 6/22 - h/o HPV and ASCUS  · Mammo 9/22     Review of Systems:     Review of Systems   Constitutional: Negative for appetite change, chills, fatigue, fever and unexpected weight change  HENT: Positive for trouble swallowing  Negative for congestion, hearing loss and voice change  Eyes: Negative for pain and visual disturbance  Respiratory: Negative for cough, shortness of breath and wheezing  Cardiovascular: Negative for chest pain, palpitations and leg swelling  Gastrointestinal: Negative for abdominal pain, blood in stool, constipation, diarrhea and nausea  Endocrine: Negative for polydipsia and polyuria  Genitourinary: Negative for difficulty urinating, dysuria, vaginal bleeding and vaginal pain  Musculoskeletal: Negative for arthralgias and back pain  Skin: Negative for rash and wound  Neurological: Negative for dizziness, light-headedness and headaches  Hematological: Does not bruise/bleed easily  Psychiatric/Behavioral: Negative for dysphoric mood and sleep disturbance  The patient is nervous/anxious         Past Medical History:     Past Medical History:   Diagnosis Date   • Anemia    • External hemorrhoids    • Lyme disease 11/2004   • Migraine    • Varicose veins of both lower extremities       Past Surgical History:     Past Surgical History:   Procedure Laterality Date   • COLONOSCOPY     • DILATION AND CURETTAGE, DIAGNOSTIC / THERAPEUTIC     • ENDOMETRIAL BIOPSY  10/21/2008    by suction   • HEMORRHOID SURGERY     • HYSTEROSCOPY W/ ENDOMETRIAL ABLATION  05/24/2010   • POLYPECTOMY      enteroscopic   • WISDOM TOOTH EXTRACTION        Social History:     Social History     Socioeconomic History   • Marital status: /Civil Union     Spouse name: None   • Number of children: None   • Years of education: None   • Highest education level: None Occupational History   • Occupation:    Tobacco Use   • Smoking status: Never Smoker   • Smokeless tobacco: Never Used   Vaping Use   • Vaping Use: Never used   Substance and Sexual Activity   • Alcohol use:  Yes     Alcohol/week: 3 0 standard drinks     Types: 3 Standard drinks or equivalent per week     Comment: social use    • Drug use: No   • Sexual activity: Yes     Partners: Male     Birth control/protection: Post-menopausal, Male Sterilization, Female Sterilization     Comment: partner had vasectomy    Other Topics Concern   • None   Social History Narrative    1 cup of coffee daily     exercising regularly      Social Determinants of Health     Financial Resource Strain: Not on file   Food Insecurity: Not on file   Transportation Needs: Not on file   Physical Activity: Not on file   Stress: Not on file   Social Connections: Not on file   Intimate Partner Violence: Not on file   Housing Stability: Not on file      Family History:     Family History   Problem Relation Age of Onset   • Hyperlipidemia Mother    • Heart disease Mother    • Hypertension Mother    • Paget's disease of bone Mother 76   • Breast cancer Mother 68        paget's   • Thyroid disease Mother    • Stroke Father    • Hypertension Father    • Prostate cancer Father 77   • Thyroid disease Father    • Melanoma Father    • No Known Problems Brother    • Mental illness Brother    • No Known Problems Brother    • No Known Problems Son    • No Known Problems Son    • Esophageal cancer Maternal Grandfather    • Cancer Maternal Grandfather    • Diabetes Paternal Grandmother    • Lung cancer Paternal Grandfather    • Cancer Paternal Grandfather    • No Known Problems Maternal Aunt    • Anemia Family    • Arthritis Family    • Depression Family    • Anxiety disorder Family    • Diabetes Family    • Lung cancer Family    • Migraines Family    • Paget's disease of bone Family    • Thyroid disease Family    • Varicose Veins Family       Current Medications:     Current Outpatient Medications   Medication Sig Dispense Refill   • Cholecalciferol (VITAMIN D-1000 MAX ST PO) Take 2 tablets by mouth in the morning     • LORazepam (ATIVAN) 0 5 mg tablet Take 1 tablet (0 5 mg total) by mouth 2 (two) times a day as needed for anxiety 30 tablet 0   • omeprazole (PriLOSEC) 40 MG capsule Take 1 capsule (40 mg total) by mouth daily 30 capsule 1   • Calcium Carb-Cholecalciferol (CALCIUM + D3) 600-200 MG-UNIT TABS Take 1 tablet by mouth daily in the early morning     • MAGNESIUM GLUCONATE PO Take 400 mg by mouth daily       No current facility-administered medications for this visit  Allergies: Allergies   Allergen Reactions   • Pollen Extract       Physical Exam:     /82   Pulse 84   Temp 98 4 °F (36 9 °C) (Tympanic)   Ht 5' 8 5" (1 74 m)   Wt 75 6 kg (166 lb 9 6 oz)   LMP  (LMP Unknown)   BMI 24 96 kg/m²     Physical Exam  Vitals and nursing note reviewed  Constitutional:       General: She is not in acute distress  Appearance: She is well-developed  She is not ill-appearing  HENT:      Head: Normocephalic and atraumatic  Right Ear: Tympanic membrane and external ear normal       Left Ear: Tympanic membrane and external ear normal    Eyes:      General:         Right eye: No discharge  Left eye: No discharge  Conjunctiva/sclera: Conjunctivae normal    Neck:      Thyroid: No thyromegaly  Trachea: No tracheal deviation  Comments: No thyroid enlargement or nodules  Cardiovascular:      Rate and Rhythm: Normal rate and regular rhythm  Heart sounds: Normal heart sounds  No murmur heard  Pulmonary:      Effort: Pulmonary effort is normal  No respiratory distress  Breath sounds: Normal breath sounds  No wheezing, rhonchi or rales  Abdominal:      General: There is no distension  Palpations: Abdomen is soft  Tenderness: There is no abdominal tenderness  There is no guarding or rebound  Musculoskeletal:         General: No deformity or signs of injury  Cervical back: Neck supple  Lymphadenopathy:      Cervical: No cervical adenopathy  Skin:     General: Skin is warm and dry  Coloration: Skin is not pale  Findings: No rash  Neurological:      General: No focal deficit present  Mental Status: She is alert  Motor: No abnormal muscle tone  Gait: Gait normal    Psychiatric:         Mood and Affect: Mood normal          Behavior: Behavior normal          Thought Content:  Thought content normal          Judgment: Judgment normal           Rankin Angelucci, DO [phone]

## 2022-10-13 NOTE — PATIENT INSTRUCTIONS

## 2022-10-19 LAB
ALBUMIN SERPL-MCNC: 4.7 G/DL (ref 3.6–5.1)
ALBUMIN/GLOB SERPL: 1.8 (CALC) (ref 1–2.5)
ALP SERPL-CCNC: 59 U/L (ref 37–153)
ALT SERPL-CCNC: 12 U/L (ref 6–29)
AST SERPL-CCNC: 14 U/L (ref 10–35)
BILIRUB SERPL-MCNC: 0.4 MG/DL (ref 0.2–1.2)
BUN SERPL-MCNC: 23 MG/DL (ref 7–25)
BUN/CREAT SERPL: ABNORMAL (CALC) (ref 6–22)
CALCIUM SERPL-MCNC: 9.5 MG/DL (ref 8.6–10.4)
CHLORIDE SERPL-SCNC: 104 MMOL/L (ref 98–110)
CO2 SERPL-SCNC: 30 MMOL/L (ref 20–32)
CREAT SERPL-MCNC: 0.71 MG/DL (ref 0.5–1.03)
EST. AVERAGE GLUCOSE BLD GHB EST-MCNC: 105 MG/DL
EST. AVERAGE GLUCOSE BLD GHB EST-SCNC: 5.8 MMOL/L
GFR/BSA.PRED SERPLBLD CYS-BASED-ARV: 101 ML/MIN/1.73M2
GLOBULIN SER CALC-MCNC: 2.6 G/DL (CALC) (ref 1.9–3.7)
GLUCOSE SERPL-MCNC: 101 MG/DL (ref 65–99)
HBA1C MFR BLD: 5.3 % OF TOTAL HGB
POTASSIUM SERPL-SCNC: 4.4 MMOL/L (ref 3.5–5.3)
PROT SERPL-MCNC: 7.3 G/DL (ref 6.1–8.1)
SODIUM SERPL-SCNC: 141 MMOL/L (ref 135–146)
TSH SERPL-ACNC: 2.93 MIU/L

## 2022-10-24 ENCOUNTER — HOSPITAL ENCOUNTER (OUTPATIENT)
Dept: RADIOLOGY | Facility: HOSPITAL | Age: 52
Discharge: HOME | End: 2022-10-24
Payer: COMMERCIAL

## 2022-10-24 DIAGNOSIS — R09.89 GLOBUS SENSATION: ICD-10-CM

## 2022-10-24 PROCEDURE — 74220 X-RAY XM ESOPHAGUS 1CNTRST: CPT

## 2022-11-11 ENCOUNTER — OFFICE VISIT (OUTPATIENT)
Dept: FAMILY MEDICINE CLINIC | Facility: HOSPITAL | Age: 52
End: 2022-11-11

## 2022-11-11 VITALS
WEIGHT: 166.6 LBS | BODY MASS INDEX: 24.68 KG/M2 | SYSTOLIC BLOOD PRESSURE: 112 MMHG | HEIGHT: 69 IN | DIASTOLIC BLOOD PRESSURE: 80 MMHG | HEART RATE: 84 BPM | TEMPERATURE: 97.6 F

## 2022-11-11 DIAGNOSIS — R09.89 GLOBUS SENSATION: ICD-10-CM

## 2022-11-11 DIAGNOSIS — R73.9 HYPERGLYCEMIA: Primary | ICD-10-CM

## 2022-11-11 DIAGNOSIS — Z23 ENCOUNTER FOR IMMUNIZATION: ICD-10-CM

## 2022-11-11 NOTE — PROGRESS NOTES
Name: Odilon Magaña      : 1970      MRN: [id number]  Encounter Provider: Edd Miller DO  Encounter Date: 2022   Encounter department: [facility] [employer]      1  Hyperglycemia  Comments:  FBS up but A1C wnl, con't low sugar/carb diet and regular exercise, recheck BW in 6 mo - BW order given  Orders:  -     CBC and differential; Future; Expected date: 2023  -     Comprehensive metabolic panel; Future; Expected date: 2023  -     Hemoglobin A1C; Future; Expected date: 2023  -     Lipid panel; Future; Expected date: 2023  -     CBC and differential  -     Comprehensive metabolic panel  -     Hemoglobin A1C  -     Lipid panel    2  Globus sensation  Comments:  Improved wiht Claritin and Omeprazole, swallow study showed no reflux and only a small hiatal hernia, will try and stop Claritin for now and con't Omeprazole until see's GI, urged to watch for triggers, urged to call with new/worse symptoms  Orders:  -     CBC and differential; Future; Expected date: 2023  -     Comprehensive metabolic panel; Future; Expected date: 2023  -     Hemoglobin A1C; Future; Expected date: 2023  -     Lipid panel; Future; Expected date: 2023  -     CBC and differential  -     Comprehensive metabolic panel  -     Hemoglobin A1C  -     Lipid panel        Colonoscopy - given the number for GI at last visit and again urged to call    Mammo     PAP     BW 10/22    Pt agreeable to flu vaccine    Subjective      HPI Pt here for follow up appt and BW results    BW results were d/w pt in detail: FBS/A1C 101/5 3, TSH and rest of CMP were wnl  Def of nml vs IFG vs DM was d/w pt in detail  Diet/exercise was reviewed - wgt unchanged from Oct 22  She is now doing intermittent fasting and walking more  Pt was started on Omeprazole and Claritin for a globus sensation    Swallow study was done and results reviewed with pt: no reflux noted, small hiatal hernia present  She is taking the medication daily w/benefit of the globus sensation  She states the Omeprazole and Claritin have helped  She notes the sensation of fullness in the throat has not gone away but is more intermittent and not as frequent  She notes no pain with swallowing/food or liquids or pills getting stuck/abd pain/N/V/D/unintetional wgt loss  She has noted decrease frequency of BM d/t intermittent fasting but notes no straining or hard stool and denies blood in stool/black stools  Review of Systems   Constitutional: Negative for chills, fever and unexpected weight change  HENT: Negative for congestion and trouble swallowing  Respiratory: Negative for cough, shortness of breath and wheezing  Cardiovascular: Negative for chest pain and palpitations  Gastrointestinal: Negative for abdominal pain, blood in stool, constipation, diarrhea, nausea and vomiting  Genitourinary: Negative for difficulty urinating and dysuria  Skin: Negative for rash and wound  Neurological: Negative for dizziness, light-headedness and headaches  Hematological: Does not bruise/bleed easily  Psychiatric/Behavioral: Negative for dysphoric mood  The patient is not nervous/anxious          Current Outpatient Medications on File Prior to Visit   Medication Sig   • Calcium Carb-Cholecalciferol (CALCIUM + D3) 600-200 MG-UNIT TABS Take 1 tablet by mouth daily in the early morning   • Cholecalciferol (VITAMIN D-1000 MAX ST PO) Take 2 tablets by mouth in the morning   • LORazepam (ATIVAN) 0 5 mg tablet Take 1 tablet (0 5 mg total) by mouth 2 (two) times a day as needed for anxiety   • MAGNESIUM GLUCONATE PO Take 400 mg by mouth daily   • omeprazole (PriLOSEC) 40 MG capsule Take 1 capsule (40 mg total) by mouth daily       Objective     /80   Pulse 84   Temp 97 6 °F (36 4 °C) (Tympanic)   Ht 5' 8 5" (1 74 m)   Wt 75 6 kg (166 lb 9 6 oz)   LMP  (LMP Unknown)   BMI 24 96 kg/m²     Physical Exam  Vitals and nursing note reviewed  Constitutional:       General: She is not in acute distress  Appearance: She is well-developed  She is not ill-appearing  HENT:      Head: Normocephalic and atraumatic  Eyes:      General:         Right eye: No discharge  Left eye: No discharge  Conjunctiva/sclera: Conjunctivae normal    Neck:      Trachea: No tracheal deviation  Cardiovascular:      Rate and Rhythm: Normal rate and regular rhythm  Heart sounds: Normal heart sounds  No murmur heard  No friction rub  Pulmonary:      Effort: Pulmonary effort is normal  No respiratory distress  Breath sounds: Normal breath sounds  No wheezing, rhonchi or rales  Abdominal:      General: There is no distension  Palpations: Abdomen is soft  Tenderness: There is no abdominal tenderness  There is no guarding or rebound  Musculoskeletal:         General: No deformity or signs of injury  Cervical back: Neck supple  Right lower leg: No edema  Left lower leg: No edema  Skin:     General: Skin is warm  Coloration: Skin is not pale  Findings: No rash  Neurological:      General: No focal deficit present  Mental Status: She is alert  Motor: No abnormal muscle tone  Gait: Gait normal    Psychiatric:         Behavior: Behavior normal          Thought Content:  Thought content normal          Judgment: Judgment normal        Helena gM DO

## 2023-01-16 ENCOUNTER — CLINICAL SUPPORT (OUTPATIENT)
Dept: FAMILY MEDICINE CLINIC | Facility: HOSPITAL | Age: 53
End: 2023-01-16

## 2023-01-16 DIAGNOSIS — Z23 ENCOUNTER FOR IMMUNIZATION: Primary | ICD-10-CM

## 2023-01-27 ENCOUNTER — EVALUATION (OUTPATIENT)
Dept: PHYSICAL THERAPY | Facility: REHABILITATION | Age: 53
End: 2023-01-27

## 2023-01-27 ENCOUNTER — OFFICE VISIT (OUTPATIENT)
Dept: OBGYN CLINIC | Facility: CLINIC | Age: 53
End: 2023-01-27

## 2023-01-27 VITALS
BODY MASS INDEX: 25.16 KG/M2 | HEIGHT: 68 IN | DIASTOLIC BLOOD PRESSURE: 72 MMHG | WEIGHT: 166 LBS | HEART RATE: 78 BPM | SYSTOLIC BLOOD PRESSURE: 115 MMHG

## 2023-01-27 DIAGNOSIS — M67.88 ACHILLES TENDONOSIS: ICD-10-CM

## 2023-01-27 DIAGNOSIS — M67.88 ACHILLES TENDONOSIS: Primary | ICD-10-CM

## 2023-01-27 NOTE — PROGRESS NOTES
PT Evaluation     Today's date: 2023  Patient name: Stevphen Dakin  : 1970  MRN: [id number]  Referring provider: Cruz Wheeler*  Dx:   Encounter Diagnosis     ICD-10-CM    1  Achilles tendonosis  M67 [id number] Ambulatory Referral to Physical Therapy          Start Time: 1500  Stop Time: 1545  Total time in clinic (min): 45 minutes    Assessment  Assessment details: Problem List:  1) limited left hip rotation and strength  2) Ankle plantarflexion strength limitaiton    Stevphen Dakin is a pleasant 45 y o  female who presents with left sided kinetic chain deficits resulting in achilles tendonosis  she has limited hip external rotation, plantarflexion weakness , and decreased hip strength resulting in the pain she experiencing,  fear of not being able to keep active  No further referral appears necessary at this time based upon examination results  I expect she will improve with physical therapy  Positive prognostic indicators include positive attitude toward recovery, good understanding of diagnosis and treatment plan options, absence of peripheralization and absence of observed red flags  Negative prognostic indicators include chronicity of symptoms and obesity  Comparable signs:  1) Pain with palpation of achilles tendon  2) Decreased plantarflexion height in single leg stance  Impairments: abnormal or restricted ROM, activity intolerance, impaired physical strength, lacks appropriate home exercise program, pain with function and poor posture     Symptom irritability: lowUnderstanding of Dx/Px/POC: good   Prognosis: good    Goals  Short Term Goals:   1  Patient will demonstrate independence with HEP by providing return demonstration of exercises  2  Patient will report decreased symptom intensity during activity by 50%  3  Harvey Chyna will run half a mile without mild increase of symptoms  4   Harvey Chyna will report decreased tension in her left gastrocnemius muscle when walking long distances    Long Term Goals:   1  Patient will improve FOTO to greater then goal  2  Patient will improve pain with activity to 2/10 or less  3  Patient will continue with HEP to allow for continued progress and function  4  Paolo Dukes will run/ walk 3 miles without exacerbation of symptoms  5  Paolo Dukes will equalize dorsiflexion AROM  Plan  Patient would benefit from: skilled physical therapy  Referral necessary: No  Planned modality interventions: biofeedback, cryotherapy and thermotherapy: hydrocollator packs  Planned therapy interventions: joint mobilization, manual therapy, muscle pump exercises, neuromuscular re-education, patient education, strengthening, stretching, therapeutic activities, therapeutic exercise, home exercise program, graded exercise, graded activity, gait training, functional ROM exercises, flexibility, body mechanics training and balance  Frequency: 2x week  Duration in visits: 12  Duration in weeks: 8  Plan of Care beginning date: 2023  Plan of Care expiration date: 3/24/2023  Treatment plan discussed with: patient        Subjective Evaluation    History of Present Illness  Mechanism of injury: In November, she tried doing a walk/ run program and started to get a lot of pain in her left achilles  Since then, she has tried to run several other times, but has had the same pain and has been afraid of trying again  She noticed she has a lump on her achilles, and that is sensitive on the medial aspect  During the day, she feels tightness if she walks long distances, or sits too long  She stretches out her calves consistently, but doesn't think it is helping  She has a history of scoliosis, ans wears a heel lift in her left shoe to help with back pain              Recurrent probem    Quality of life: good    Pain  Current pain ratin  At best pain ratin  At worst pain ratin  Quality: tight and sharp  Relieving factors: rest  Aggravating factors: running  Progression: no change    Patient Goals  Patient goals for therapy: decreased pain, improved balance, increased motion, increased strength, return to sport/leisure activities and independence with ADLs/IADLs  Patient goal: return to run        Objective     Posture: decreased left arch in standing     Palpation: tender to palpation through left achilles, and tarsal tunnel             MMT         AROM          PROM    Hip       L       R        L           R      L     R   ER    30 45     IR   20 45     ABD 3+ 4-       FLEX 4- 4       EXT 3+ 4       Knee         Extension 5 5       Flexion 4+ 4+                Ankle         Dorsi Flexion 4+ 4+ 30 degrees 40 degrees     Plantar Flexion 4 4+       Inversion 4 4+       Eversion 4+ 4+                    Comments:       Precautions: Standard      Manuals                                                                 Neuro Re-Ed             Hell raise 1x12 4" ecc  HEP                                                                                           Ther Ex             Bridge 3x12 HEP            Figure 4 glute stretch 3x30" HEP                                                                                          Ther Activity                                       Gait Training                                       Modalities

## 2023-01-27 NOTE — PROGRESS NOTES
Orthopedic Sports Medicine New Patient Ankle Visit    Assesment:  45 y o  female left Achilles tendonosis    Plan: We had a long discussion regarding the diagnosis and treatment plan  I recommended a course of physical therapy  She may return to full activities as pain allows, avoiding activities that cause worsening of pain  She can increase her activities as tolerated as symptoms improve  I will see her back in 2 months after PT  If symptoms have not improved we may consider additional advanced imaging  Follow up:    Return in about 2 months (around 3/27/2023) for Recheck  Chief Complaint   Patient presents with   • Left Foot - Pain           History of Present Illness: The patient is a 45 y o  female who presents today for evaluation of Left achilles tendon tightness  Pt states that she started to run in the fall  She states that she started to get achilles pain and stopped running for a week  She started to run again and got pain  She has since only done the [employer] and elliptical with no pain  She denies any pain with activity nor any tenderness  She does report tightness in the AM and needs to stretch a loosen up before she gets moving         Ankle Surgical History:  None      Past Medical, Social and Family History:  Past Medical History:   Diagnosis Date   • Anemia    • External hemorrhoids    • Lyme disease 11/2004   • Migraine    • Varicose veins of both lower extremities      Past Surgical History:   Procedure Laterality Date   • COLONOSCOPY     • DILATION AND CURETTAGE, DIAGNOSTIC / THERAPEUTIC     • ENDOMETRIAL BIOPSY  10/21/2008    by suction   • HEMORRHOID SURGERY     • HYSTEROSCOPY W/ ENDOMETRIAL ABLATION  05/24/2010   • POLYPECTOMY      enteroscopic   • WISDOM TOOTH EXTRACTION       Allergies   Allergen Reactions   • Pollen Extract      Current Outpatient Medications on File Prior to Visit   Medication Sig Dispense Refill   • Calcium Carb-Cholecalciferol (CALCIUM + D3) 600-200 MG-UNIT TABS Take 1 tablet by mouth daily in the early morning     • Cholecalciferol (VITAMIN D-1000 MAX ST PO) Take 2 tablets by mouth in the morning     • LORazepam (ATIVAN) 0 5 mg tablet Take 1 tablet (0 5 mg total) by mouth 2 (two) times a day as needed for anxiety 30 tablet 0   • MAGNESIUM GLUCONATE PO Take 400 mg by mouth daily     • omeprazole (PriLOSEC) 40 MG capsule Take 1 capsule (40 mg total) by mouth daily 30 capsule 1     No current facility-administered medications on file prior to visit  Social History     Socioeconomic History   • Marital status: /Civil Union     Spouse name: Not on file   • Number of children: Not on file   • Years of education: Not on file   • Highest education level: Not on file   Occupational History   • Occupation:    Tobacco Use   • Smoking status: Never   • Smokeless tobacco: Never   Vaping Use   • Vaping Use: Never used   Substance and Sexual Activity   • Alcohol use: Yes     Alcohol/week: 3 0 standard drinks     Types: 3 Standard drinks or equivalent per week     Comment: social use    • Drug use: No   • Sexual activity: Yes     Partners: Male     Birth control/protection: Post-menopausal, Male Sterilization, Female Sterilization     Comment: partner had vasectomy    Other Topics Concern   • Not on file   Social History Narrative    1 cup of coffee daily     exercising regularly      Social Determinants of Health     Financial Resource Strain: Not on file   Food Insecurity: Not on file   Transportation Needs: Not on file   Physical Activity: Not on file   Stress: Not on file   Social Connections: Not on file   Intimate Partner Violence: Not on file   Housing Stability: Not on file         I have reviewed the past medical, surgical, social and family history, medications and allergies as documented in the EMR  Review of systems: ROS is negative other than that noted in the HPI  Constitutional: Negative for fatigue and fever     HENT: Negative for sore throat  Respiratory: Negative for shortness of breath  Cardiovascular: Negative for chest pain  Gastrointestinal: Negative for abdominal pain  Endocrine: Negative for cold intolerance and heat intolerance  Genitourinary: Negative for flank pain  Musculoskeletal: Negative for back pain  Skin: Negative for rash  Allergic/Immunologic: Negative for immunocompromised state  Neurological: Negative for dizziness  Psychiatric/Behavioral: Negative for agitation  Physical Exam:    Blood pressure 115/72, pulse 78, height 5' 8" (1 727 m), weight 75 3 kg (166 lb)      General/Constitutional: NAD, well developed, well nourished  HENT: Normocephalic, atraumatic  CV: Intact distal pulses, regular rate  Resp: No respiratory distress or labored breathing  Lymphatic: No lymphadenopathy palpated  Neuro: Alert and Oriented x 3, no focal deficits appreciated  Psych: Normal mood, normal affect  Skin: Warm, dry, no rashes, no erythema       Ankle Examination (focused):                            Negative tenderness at achilles insertion  + Tenderness 3 cm above insertion at area of welling  Negative Florian test  Negative Calcaneus squeeze  Negative plantar fascia tenderness    Positive Silfverskiold test    Strength Normal all ranges  Able to perform a single heel rise    No subluxation of the peroneal tendons or tenderness to palpation along the peroneal tendons     No pain with palpation or range of motion of midfoot and forefoot bilaterally    Normal heel toe gait    No calf tenderness to palpation bilaterally    LE NV Exam: +2 DP/PT pulses bilaterally  Sensation intact to light touch L2-S1 bilaterally        Ankle Imaging:    No images reviewed      Scribe Attestation    I,:  Alexandra Flores am acting as a scribe while in the presence of the attending physician :       I,:  Milla Magaña MD personally performed the services described in this documentation    as scribed in my presence :

## 2023-01-30 ENCOUNTER — OFFICE VISIT (OUTPATIENT)
Dept: PHYSICAL THERAPY | Facility: REHABILITATION | Age: 53
End: 2023-01-30

## 2023-01-30 DIAGNOSIS — M67.88 ACHILLES TENDONOSIS: Primary | ICD-10-CM

## 2023-01-30 NOTE — PROGRESS NOTES
Daily Note     Today's date: 2023  Patient name: Dom Sarabia  : 1970  MRN: [id number]  Referring provider: Gigi Baltazar*  Dx:   Encounter Diagnosis     ICD-10-CM    1  Achilles tendonosis  M67 88                      Subjective: Le says her ankle is feeling good, but sore today  She did her exercises in addition to using the elliptical over the weekend  Objective: See treatment diary below      Assessment: Le had an appropriate response to interventions performed today  She demonstrates muscular fatigue with heel raises, and progressively performs repetitions with decreased range of motion on her LLE  Patient was able to perform repititons with rapid eccentric lowering bilaterally without symptom onset, but had symptom reproduction after 2 minutes of jogging on treadmill  Le would benefit from continued skilled physical therapy  Plan: Continue per plan of care        Precautions: Standard      Manuals                                                                 Neuro Re-Ed             Heel raise 1x12 4" ecc  HEP  1x 15 4" ecc    2x15  Quick lower    1x10 2 up 1 down                                                                                         Ther Ex             Bridge 3x12 HEP 3x15  rhb           Figure 4 glute stretch 3x30" HEP            Treadmill   5' lvl 2 0    2' lvl 4 2 (p)               Ankle INV & EV  2x15 BTB           Lunge  2x10             90/90 IR/ER stretch  2x30"                                     Ther Activity                                       Gait Training                                       Modalities

## 2023-02-03 ENCOUNTER — OFFICE VISIT (OUTPATIENT)
Dept: PHYSICAL THERAPY | Facility: REHABILITATION | Age: 53
End: 2023-02-03

## 2023-02-03 DIAGNOSIS — M67.88 ACHILLES TENDONOSIS: Primary | ICD-10-CM

## 2023-02-03 NOTE — PROGRESS NOTES
Daily Note     Today's date: 2/3/2023  Patient name: Tahira Wallis  : 1970  MRN: [id number]  Referring provider: Jacquelyn Zambrano  Dx:   Encounter Diagnosis     ICD-10-CM    1  Achilles tendonosis  M67 88           Start Time: 96  Stop Time:   Total time in clinic (min): 45 minutes    Subjective: Tobias Benavidez says her ankle feels pretty good, but she hasn't tried to run  She still feels like her ankle gets tight though  Objective: See treatment diary below      Assessment: Tobias Benavidez responded appropriately to interventions performed today  Progressed patient to heel elevated split squats to further stress lower extremity musculature  Lala's right lower extremity tends to dominate movement patterns and fatigues more quickly than her left lower extremity  Progressed patient's home exercise program to allow for self progression of strengthening exercises  Tobias Benavidez would benefit from continued skilled physical therapy  Plan: Continue per plan of care        Precautions: Standard      Manuals                                                                 Neuro Re-Ed  1/30 2/3          Heel raise 1x12 4" ecc  HEP  1x 15 4" ecc    2x15  Quick lower    1x10 2 up 1 down 15lbs  3x12  4" ecc  1x12 quick down    3x10 2 up 1 down    Leg press  120#  2x12          Hops   PMB in squat rack  3x30 hops                                                                           Ther Ex             Bridge 3x12 HEP 3x15  rhb           Figure 4 glute stretch 3x30" HEP            Treadmill   5' lvl 2 0    2' lvl 4 2 (p)     5' self selected walk          Ankle INV & EV  2x15 BTB           Lunge  2x10   3x10 + HR          90/90 IR/ER stretch  2x30" 3' with transitions          Crab walks   3x10' laps  YTB on mets                       Ther Activity                                       Gait Training                                       Modalities

## 2023-02-06 ENCOUNTER — OFFICE VISIT (OUTPATIENT)
Dept: PHYSICAL THERAPY | Facility: REHABILITATION | Age: 53
End: 2023-02-06

## 2023-02-06 DIAGNOSIS — M67.88 ACHILLES TENDONOSIS: Primary | ICD-10-CM

## 2023-02-06 NOTE — PROGRESS NOTES
Daily Note     Today's date: 2023  Patient name: Viri Bill  : 1970  MRN: [id number]  Referring provider: Kel Camacho*  Dx:   Encounter Diagnosis     ICD-10-CM    1  Achilles tendonosis  M67 88                      Subjective: Wilmer Pablo says she isn't able to do the leg press heel raise at her gym, because she can't move the heavy weights  She also says she doesn't want to do the suspension treadmill, and just wants to keep working until she can run on the regular treadmill  Objective: See treatment diary below      Assessment: Wilmer Pablo had an appropriate response to interventions performed today  Wilmer Pablo has made slight improvement with her hip rotation, but continues to have deficits with both internal and externall rotation on her LLE  She continues to demonstrate weakness of plantar flexor musculature, and demonstrates decreased heel raise height actively  She notes muscular fatigue through her longitudinal arch with heel raises secondary to plantarflexors and supinator weakness  Wilmer Pablo would benefit from continued skilled physical therapy  Plan: Continue per plan of care        Precautions: Standard      Manuals                                                                 Neuro Re-Ed   23 26         Heel raise 1x12 4" ecc  HEP  1x 15 4" ecc    2x15  Quick lower    1x10 2 up 1 down 15lbs  3x12  4" ecc  1x12 quick down    3x10 2 up 1 down    Leg press  120#  2x12 15#  3x12  3" ecc  2 up 1 down    120#  2x12         Hops   PMB in squat rack  3x30 hops PMB  PMB  4x45  Hops YMB NV                                                                         Ther Ex             Bridge 3x12 HEP 3x15  rhb           Figure 4 glute stretch 3x30" HEP            Treadmill   5' lvl 2 0    2' lvl 4 2 (p)     5' self selected walk 6' self selected walk         Ankle INV & EV  2x15 BTB  2x15 Silver         Lunge  2x10   3x10 + HR 3x8 + HR  10#         90/90 IR/ER stretch  2x30" 3' with transitions 3' with transition         Crab walks   3x10' laps  YTB on mets 3x12' laps   YTB on mets                      Ther Activity                                       Gait Training                                       Modalities

## 2023-02-10 ENCOUNTER — OFFICE VISIT (OUTPATIENT)
Dept: PHYSICAL THERAPY | Facility: REHABILITATION | Age: 53
End: 2023-02-10

## 2023-02-10 DIAGNOSIS — M67.88 ACHILLES TENDONOSIS: Primary | ICD-10-CM

## 2023-02-10 NOTE — PROGRESS NOTES
Daily Note     Today's date: 2/10/2023  Patient name: Pallavi Reynoso  : 1970  MRN: [id number]  Referring provider: Gato Messer*  Dx:   Encounter Diagnosis     ICD-10-CM    1  Achilles tendonosis  M67 88                      Subjective: Ariella Johnson says her ankle is feeling pretty good today  She feels like the swollen and tender spot is getting smaller  Objective: See treatment diary below      Assessment: Ariella Johnson had an appropriate response to interventions performed today  She reports onset of symptoms with banded hops, due to increased intensity of exercise  Recommended patient remove heel lit from shoe to increase load and excursion of achilles tendon  Altered split squat to flat foot to remove balance demand from exercise  Ariella Johnson would benefit from continued skilled physical therapy  Next session  Progress patient to double leg jumps to increase tendon loading  Plan: Continue per plan of care        Precautions: Standard      Manuals     2/10        Talocrural     GR V  LM                                               Neuro Re-Ed   2/3 2 210        Heel raise 1x12 4" ecc  HEP  1x 15 4" ecc    2x15  Quick lower    1x10 2 up 1 down 15lbs  3x12  4" ecc  1x12 quick down    3x10 2 up 1 down    Leg press  120#  2x12 15#  3x12  3" ecc  2 up 1 down    120#  2x12 140#  3x12 3" ecc    60#  3x12 2 up 1 down  Quick lower        Hops   PMB in squat rack  3x30 hops PMB  PMB  4x45  Hops 2x20  2x10 YMB SL hops                                                                         Ther Ex             Bridge 3x12 HEP 3x15  rhb           Figure 4 glute stretch 3x30" HEP            Treadmill   5' lvl 2 0    2' lvl 4 2 (p)     5' self selected walk 6' self selected walk 7' self selected        Ankle INV & EV  2x15 BTB  2x15 Silver         Lunge  2x10   3x10 + HR 3x8 + HR  10# 3x12  15#        90/90 IR/ER stretch  2x30" 3' with transitions 3' with transition         Crab walks   3x10' laps  YTB on mets 3x12' laps   YTB on mets                      Ther Activity                                       Gait Training                                       Modalities

## 2023-02-13 ENCOUNTER — OFFICE VISIT (OUTPATIENT)
Dept: PHYSICAL THERAPY | Facility: REHABILITATION | Age: 53
End: 2023-02-13

## 2023-02-13 DIAGNOSIS — M67.88 ACHILLES TENDONOSIS: Primary | ICD-10-CM

## 2023-02-13 NOTE — PROGRESS NOTES
Daily Note     Today's date: 2023  Patient name: Cher Elizondo  : 1970  MRN: [id number]  Referring provider: Almaz Padgett*  Dx:   Encounter Diagnosis     ICD-10-CM    1  Achilles tendonosis  M67 88           Start Time: 730  Stop Time: 815  Total time in clinic (min): 45 minutes    Subjective: Bunny Negron says her legs were a little sore after last session, but she still went on the elliptical  She says the ankle hasn't given her any problem after physical therapy  Objective: See treatment diary below      Assessment: Bunny Negron had an appropriate response to interventions performed today  Adjusted exercise order to gradually increase stress directed at achilles tendon  Bunny Negron continues to note difficulty on lunges with her right leg forward, likely due to stability deficits on left leg  She notes cramping through her longitudinal arch during single leg heel raises, cramping due to muscular contribution of supporting extrinsic musculature to assist with weak plantarflexors  Trialed hopping, with patient noting symptoms onset after one set  Symptoms decreased when rest time was extended to two minutes between sets  Bunny Negron would benefit from continued skilled physical therapy  HEP: lunges, 90/90, ankle inv/ ev, heel raises      Plan: Continue per plan of care        Precautions: Standard      Manuals     2/10 2/13       Talocrural     GR V  LM GR V LM                                              Neuro Re-Ed  1/30 2/3 2/6 2/10 2/13       Heel raise 1x12 4" ecc  HEP  1x 15 4" ecc    2x15  Quick lower    1x10 2 up 1 down 15lbs  3x12  4" ecc  1x12 quick down    3x10 2 up 1 down    Leg press  120#  2x12 15#  3x12  3" ecc  2 up 1 down    120#  2x12 140#  3x12 3" ecc    60#  3x12 2 up 1 down  Quick lower 140# 3x12 5" ecc    70#  3x12  Rapid ecc       Hops   PMB in squat rack  3x30 hops PMB  PMB  4x45  Hops 2x20  2x10 YMB SL hops DL hops  3x15 Ther Ex  1/30           Bridge 3x12 HEP 3x15  rhb           Figure 4 glute stretch 3x30" HEP            Treadmill   5' lvl 2 0    2' lvl 4 2 (p)     5' self selected walk 6' self selected walk 7' self selected 8' self selected       Ankle INV & EV  2x15 BTB  2x15 Silver         Lunge  2x10   3x10 + HR 3x8 + HR  10# 3x12  15# 3x12  15#       90/90 IR/ER stretch  2x30" 3' with transitions 3' with transition         Crab walks   3x10' laps  YTB on mets 3x12' laps   YTB on mets  3x20' laps YTB on mets                    Ther Activity                                       Gait Training                                       Modalities

## 2023-02-21 ENCOUNTER — OFFICE VISIT (OUTPATIENT)
Dept: PHYSICAL THERAPY | Facility: REHABILITATION | Age: 53
End: 2023-02-21

## 2023-02-21 DIAGNOSIS — M67.88 ACHILLES TENDONOSIS: Primary | ICD-10-CM

## 2023-02-21 NOTE — PROGRESS NOTES
Daily Note     Today's date: 2023  Patient name: Anil Vega  : 1970  MRN: [id number]  Referring provider: Aram Yost*  Dx:   Encounter Diagnosis     ICD-10-CM    1  Achilles tendonosis  M67 88                      Subjective: Manoj Lamas says her ankle has been feeling good over the last couple of days  Objective: See treatment diary below      Assessment: Manoj Lamas had an appropriate response to interventions performed today  Increased volume and intensity of exercises to expose healing tendon to novel levels of stress to build strength, and endurance  Incorporated standing foot arch exercise to improve foot posture and increase strength of longitudinal arch  Manoj Lamas has difficulty maintaining static foot posture without engaging FHL and flexing her great toe  Coordination and strength deficits appear related to her current symptom presentation  Manoj Lamas has difficulty dissociating trunk and lumbar spine movements on single leg RDL, and exercise was regressed to double leg RDL  Difficulties are more than likely a result of decreased hip stability  Manoj Lamas would benefit from continued skilled physical therapy  Plan: Continue per plan of care        Precautions: Standard      Manuals     2/10 2/13 2/21      Talocrural     GR V  LM GR V LM                                              Neuro Re-Ed  1/30 2/3 2/6 2/10 2/13 2/21      Heel raise 1x12 4" ecc  HEP  1x 15 4" ecc    2x15  Quick lower    1x10 2 up 1 down 15lbs  3x12  4" ecc  1x12 quick down    3x10 2 up 1 down    Leg press  120#  2x12 15#  3x12  3" ecc  2 up 1 down    120#  2x12 140#  3x12 3" ecc    60#  3x12 2 up 1 down  Quick lower 140# 3x12 5" ecc    70#  3x12  Rapid ecc 150 4x12 3" ecc      80#  3x12 rapid ecc      Hops   PMB in squat rack  3x30 hops PMB  PMB  4x45  Hops 2x20  2x10 YMB SL hops DL hops  3x15 SL 4x30 BMB      Foot arches       standing  4x10 between lunges      SL weight pass       2x10 5# Ther Ex  1/30           Bridge 3x12 HEP 3x15  rhb           Figure 4 glute stretch 3x30" HEP            Treadmill   5' lvl 2 0    2' lvl 4 2 (p)     5' self selected walk 6' self selected walk 7' self selected 8' self selected 8' self selected pace      Ankle INV & EV  2x15 BTB  2x15 Silver         RDL       Single leg  1x12 10#    Double  2x10 15# at wall      Lunge  2x10   3x10 + HR 3x8 + HR  10# 3x12  15# 3x12  15# 2x12 15#    2x12 15# (5#, 15#)      90/90 IR/ER stretch  2x30" 3' with transitions 3' with transition         Crab walks   3x10' laps  YTB on mets 3x12' laps   YTB on mets  3x20' laps YTB on mets 3x30' ytb on mets                   Ther Activity                                       Gait Training                                       Modalities

## 2023-02-28 ENCOUNTER — OFFICE VISIT (OUTPATIENT)
Dept: PHYSICAL THERAPY | Facility: REHABILITATION | Age: 53
End: 2023-02-28

## 2023-02-28 DIAGNOSIS — M67.88 ACHILLES TENDONOSIS: Primary | ICD-10-CM

## 2023-02-28 NOTE — PROGRESS NOTES
Daily Note     Today's date: 2023  Patient name: Anne Marie Rodriguez  : 1970  MRN: [id number]  Referring provider: Claudia Saha*  Dx:   Encounter Diagnosis     ICD-10-CM    1  Achilles tendonosis  M67 88                      Subjective: Brie Santoro says her ankle has been doing well  She Hasn't been running, but it has been feeling less stiff in the morning  She is worried she is having a vascular problem that is making this worse      Objective: See treatment diary below    Dermatome  L3- Diminished  L4- Absent    Post repeated extensions  L3- diminished  L4- diminished    Slump  + on left    SLR  + tibial nerve bias L  Repeated motions exam  Worsens with flexion  Improves with extension    Assessment: Brie Santoro had an appropriate response to interventions performed today  She notes symptom onset after approximately 30 seconds of running that builds with time  Lack of change in duration with running likely related to insufficient time for physiologic change  Patient presents with chronic dermatomal changes in her LLE that are likely related to her history of chronic back pain  She presents with residual neural tension in her LLE, likely contributing to her current symptom presentation of ankle pain  She experienced a reduction in neural tension and improvement with sensation after repeated extensions in standing  It is unlikely that symptoms are related to vascular issues due to lack of symptom increase with most general exercise  Brie Santoro would benefit from continued skilled physical therapy  Plan: Continue per plan of care        Precautions: Standard      Manuals     2/10 2/13 2/21 2/28     Talocrural     GR V  LM GR V LM       IASTM        L  achilles 5'                  Assesment        15'     Neuro Re-Ed   2/3 2/6 2/10 2/13 2/21 2/28     Heel raise 1x12 4" ecc  HEP  1x 15 4" ecc    2x15  Quick lower    1x10 2 up 1 down 15lbs  3x12  4" ecc  1x12 quick down    3x10 2 up 1 down    Leg press  120#  2x12 15#  3x12  3" ecc  2 up 1 down    120#  2x12 140#  3x12 3" ecc    60#  3x12 2 up 1 down  Quick lower 140# 3x12 5" ecc    70#  3x12  Rapid ecc 150 4x12 3" ecc      80#  3x12 rapid ecc      Hops   PMB in squat rack  3x30 hops PMB  PMB  4x45  Hops 2x20  2x10 YMB SL hops DL hops  3x15 SL 4x30 BMB      Foot arches       standing  4x10 between lunges      SL weight pass       2x10 5#      JED        2x15     Slump slider        1x10                  Ther Ex  1/30           Bridge 3x12 HEP 3x15  rhb           Figure 4 glute stretch 3x30" HEP            Treadmill   5' lvl 2 0    2' lvl 4 2 (p)     5' self selected walk 6' self selected walk 7' self selected 8' self selected 8' self selected pace 8' self selected    1' lvl 4 2 (p) after 30"     Ankle INV & EV  2x15 BTB  2x15 Silver         RDL       Single leg  1x12 10#    Double  2x10 15# at wall      Lunge  2x10   3x10 + HR 3x8 + HR  10# 3x12  15# 3x12  15# 2x12 15#    2x12 15# (5#, 15#)      90/90 IR/ER stretch  2x30" 3' with transitions 3' with transition         Crab walks   3x10' laps  YTB on mets 3x12' laps   YTB on mets  3x20' laps YTB on mets 3x30' ytb on mets                   Ther Activity                                       Gait Training                                       Modalities

## 2023-03-06 ENCOUNTER — OFFICE VISIT (OUTPATIENT)
Dept: PHYSICAL THERAPY | Facility: REHABILITATION | Age: 53
End: 2023-03-06

## 2023-03-06 DIAGNOSIS — M67.88 ACHILLES TENDONOSIS: Primary | ICD-10-CM

## 2023-03-06 NOTE — PROGRESS NOTES
Daily Note     Today's date: 3/6/2023  Patient name: Anne Marie Rodriguez  : 1970  MRN: [id number]  Referring provider: Claudia Esteves  Dx: No diagnosis found  Subjective: Brie Santoro says her ankle is about the same  She has been doing her exercises, but hasn't seen a huge difference yet  Objective: See treatment diary below      Assessment: Brie Santoro had an appropriate response to interventions performed today  Patient executed HEP with good form and minimal cueing  Therapist and patient discussed reducing frequency of visits with patient agreeing to updated plan  Brie Santoro continues to experience a reduction in sensation changes with repeated extensions  Patient experienced an increase in passive straight leg raise after manual therapy  Patient expresses minor difficulty with left active straight leg raise, with effort reducing with compression on pelvic girdle  Patient continues to demonstrate active and passive range of motion restriction with hip external rotation  Patient's motion responded to stretching indicating muscular limitations  Active straight leg test indicates potential core weakness and muscular coordination difficulties  Brie Santoro would benefit from continued skilled physical therapy    HEP: repeated extensions, heel raise (gastroc & soleus), 90/90 stretch, lunges  Plan: Continue per plan of care        Precautions: Standard      Manuals     2/10 2/13 2/21 2/28 3/6    Talocrural     GR V  LM GR V LM       IASTM        L  achilles 5'                  Assesment        15' 25'    Neuro Re-Ed  1/30 2/3 2/6 2/10 2/13 2/21 2/28     Heel raise 1x12 4" ecc  HEP  1x 15 4" ecc    2x15  Quick lower    1x10 2 up 1 down 15lbs  3x12  4" ecc  1x12 quick down    3x10 2 up 1 down    Leg press  120#  2x12 15#  3x12  3" ecc  2 up 1 down    120#  2x12 140#  3x12 3" ecc    60#  3x12 2 up 1 down  Quick lower 140# 3x12 5" ecc    70#  3x12  Rapid ecc 150 4x12 3" ecc      80#  3x12 rapid ecc      Hops PMB in squat rack  3x30 hops PMB  PMB  4x45  Hops 2x20  2x10 YMB SL hops DL hops  3x15 SL 4x30 BMB      Foot arches       standing  4x10 between lunges      SL weight pass       2x10 5#      JED        2x15 2x15  Prone  1x15    Slump slider        1x10 1x10                 Ther Ex  1/30           Bridge 3x12 HEP 3x15  rhb           Figure 4 glute stretch 3x30" HEP            Treadmill   5' lvl 2 0    2' lvl 4 2 (p)     5' self selected walk 6' self selected walk 7' self selected 8' self selected 8' self selected pace 8' self selected    1' lvl 4 2 (p) after 30"     Ankle INV & EV  2x15 BTB  2x15 Silver         RDL       Single leg  1x12 10#    Double  2x10 15# at wall      Lunge  2x10   3x10 + HR 3x8 + HR  10# 3x12  15# 3x12  15# 2x12 15#    2x12 15# (5#, 15#)      90/90 IR/ER stretch  2x30" 3' with transitions 3' with transition     3x30" ea    Crab walks   3x10' laps  YTB on mets 3x12' laps   YTB on mets  3x20' laps YTB on mets 3x30' ytb on mets                   Ther Activity                                       Gait Training                                       Modalities

## 2023-03-08 ENCOUNTER — CONSULT (OUTPATIENT)
Dept: GASTROENTEROLOGY | Facility: CLINIC | Age: 53
End: 2023-03-08

## 2023-03-08 ENCOUNTER — TELEPHONE (OUTPATIENT)
Dept: GASTROENTEROLOGY | Facility: CLINIC | Age: 53
End: 2023-03-08

## 2023-03-08 VITALS
HEIGHT: 69 IN | DIASTOLIC BLOOD PRESSURE: 90 MMHG | SYSTOLIC BLOOD PRESSURE: 138 MMHG | WEIGHT: 168.8 LBS | BODY MASS INDEX: 25 KG/M2

## 2023-03-08 DIAGNOSIS — Z12.11 SCREENING FOR COLORECTAL CANCER: Primary | ICD-10-CM

## 2023-03-08 DIAGNOSIS — Z12.12 SCREENING FOR COLORECTAL CANCER: Primary | ICD-10-CM

## 2023-03-08 DIAGNOSIS — R09.89 GLOBUS SENSATION: ICD-10-CM

## 2023-03-08 RX ORDER — CALCIUM CARBONATE 200(500)MG
1 TABLET,CHEWABLE ORAL AS NEEDED
COMMUNITY

## 2023-03-08 NOTE — PROGRESS NOTES
[id number] - Outpatient Consultation  Kenzie Ashli 45 y o  female MRN: [id number]  Encounter: [id number]    ASSESSMENT AND PLAN:      1  Globus sensation  Patient with the symptoms of globus  Unlikely that it is due to reflux as omeprazole did not help with the symptoms at all  There may be an element of structural or mechanical abnormalities that could be causing the sensation  We will directly visualize with EGD to see if there is any components of that  Can be erosive disease, ulcerative disease, partial gastric outlet obstruction  Also may be functional in nature  - Ambulatory Referral to Gastroenterology  - EGD; Future    2  Screening for colorectal cancer  Patient is due for colon cancer screening with colonoscopy  Does have a family history of colon polyps in her dad  Colon cancer in her grandmother  Last colonoscopy was about 9 years ago  Patient is not on diabetic medications or blood thinners  We will schedule the patient for colonoscopy and have advised the patient to take the bowel preparation in a split dose bowel preparation     - Colonoscopy; Future  - sodium picosulfate, magnesium oxide, citric acid 10-3 5-12 MG-GM -GM/160ML SOLN; Take 160 mL by mouth once for 1 dose Take 160 mL by mouth once for 1 dose  Dispense: 160 mL; Refill: 0      Follow up Appointment: For EGD and colonoscopy    Chief Complaint   Patient presents with   • colonoscopy screening, feeling like something stuck in esop     *esophagus, has full feeling except when eating  HPI:   Patient is a 22-year-old female who is presenting as a consultation from PCP regarding globus sensation  She states that she for started having symptoms around the fall 2022  Has persistent through this time    She states that she has a globus sensation in the back of her throat and sometimes in the middle of her chest   Does feel like solid foods can get stuck at times but ultimately does go down with liquid intake  Denies any regurgitation nausea vomiting  Patient denies any abdominal pain or changes in bowel movement with diarrhea or constipation  Denies any GI bleeding  No unintentional weight loss  She has trialed omeprazole 40 mg for about 2 months which did not affect the symptoms at all  Had a barium swallow on 10/2022 with very small hiatal hernia  No abnormalities in the esophagus  Answers for HPI/ROS submitted by the patient on 3/2/2023  Chronicity: recurrent  Onset: more than 1 month ago  Onset quality: gradual  Frequency: rarely  Progression since onset: resolved  Pain location: periumbilical region  Pain - numeric: 1/10  Pain quality: dull  Radiates to: periumbilical region  anorexia: No  arthralgias: Yes  belching: Yes  constipation: Yes  diarrhea: No  dysuria: No  fever: No  flatus: No  frequency: No  headaches: No  hematochezia: No  hematuria: No  melena: No  myalgias: No  nausea: No  weight loss: No  vomiting: No  Aggravated by: certain positions  Relieved by: belching        GI History:  Blood thinners: Denies ASA, antiplatelet, or anticoagulation  NSAID use: Denies  Insulin use: None    Abdominal Surgical Hx: None  Family Hx: Denies first degree relatives with GI malignancies  Father with polyps  Grandmother with possible colon cancer    GI procedure Hx: Colonoscopy when she was 38 without any polyps    Historical Information   Past Medical History:   Diagnosis Date   • Anemia    • External hemorrhoids    • Lyme disease 11/2004   • Migraine    • Varicose veins of both lower extremities      Past Surgical History:   Procedure Laterality Date   • COLONOSCOPY     • DILATION AND CURETTAGE, DIAGNOSTIC / THERAPEUTIC     • ENDOMETRIAL BIOPSY  10/21/2008    by suction   • HEMORRHOID SURGERY     • HYSTEROSCOPY W/ ENDOMETRIAL ABLATION  05/24/2010   • POLYPECTOMY      enteroscopic   • WISDOM TOOTH EXTRACTION       Social History     Substance and Sexual Activity   Alcohol Use Yes • Alcohol/week: 3 0 standard drinks   • Types: 3 Standard drinks or equivalent per week    Comment: social use      Social History     Substance and Sexual Activity   Drug Use No     Social History     Tobacco Use   Smoking Status Never   Smokeless Tobacco Never     Family History   Problem Relation Age of Onset   • Hyperlipidemia Mother    • Heart disease Mother    • Hypertension Mother    • Paget's disease of bone Mother 76   • Breast cancer Mother         paget's   • Thyroid disease Mother    • Arthritis Mother    • Cancer Mother    • Stroke Father    • Hypertension Father    • Prostate cancer Father 77   • Thyroid disease Father    • Melanoma Father    • Arthritis Father    • Cancer Father    • No Known Problems Brother    • Mental illness Brother    • No Known Problems Brother    • No Known Problems Son    • No Known Problems Son    • Esophageal cancer Maternal Grandfather    • Cancer Maternal Grandfather    • Diabetes Paternal Grandmother    • Mental illness Paternal Grandmother    • Lung cancer Paternal Grandfather    • Cancer Paternal Grandfather    • No Known Problems Maternal Aunt    • Anemia Family    • Arthritis Family    • Depression Family    • Anxiety disorder Family    • Diabetes Family    • Lung cancer Family    • Migraines Family    • Paget's disease of bone Family    • Thyroid disease Family    • Varicose Veins Family        Meds/Allergies     Current Outpatient Medications:   •  Ca Carbonate-Mag Hydroxide (ROLAIDS PO)  •  Calcium Carb-Cholecalciferol (CALCIUM + D3) 600-200 MG-UNIT TABS  •  calcium carbonate (TUMS) 500 mg chewable tablet  •  Cholecalciferol (VITAMIN D-1000 MAX ST PO)  •  LORazepam (ATIVAN) 0 5 mg tablet  •  MAGNESIUM GLUCONATE PO  •  sodium picosulfate, magnesium oxide, citric acid 10-3 5-12 MG-GM -GM/160ML SOLN  •  omeprazole (PriLOSEC) 40 MG capsule    Allergies   Allergen Reactions   • Pollen Extract        PHYSICAL EXAM:    Blood pressure 138/90, height 5' 9" (1 753 m), weight 76 6 kg (168 lb 12 8 oz)  Body mass index is 24 93 kg/m²  General Appearance: NAD, cooperative, alert  Eyes: Anicteric  GI:  Soft, non-tender, non-distended; normal bowel sounds; no masses, no organomegaly   Rectal: Deferred  Musculoskeletal: No edema  Skin:  No jaundice    Lab Results:   Lab Results   Component Value Date    WBC 4 28 (L) 10/22/2021    WBC 4 0 08/02/2021    WBC 3 8 06/16/2020    HGB 12 4 10/22/2021    HGB 12 7 08/02/2021    HGB 12 3 06/16/2020    MCV 96 10/22/2021     10/22/2021     08/02/2021     06/16/2020     Lab Results   Component Value Date    K 4 4 10/18/2022     10/18/2022    CO2 30 10/18/2022    BUN 23 10/18/2022    CREATININE 0 71 10/18/2022    CALCIUM 9 5 10/18/2022    AST 14 10/18/2022    AST 12 08/02/2021    AST 10 06/16/2020    ALT 12 10/18/2022    ALT 9 08/02/2021    ALT 8 06/16/2020    ALKPHOS 59 10/18/2022    ALKPHOS 47 08/02/2021    ALKPHOS 40 06/16/2020    EGFR 101 10/18/2022     No results found for: IRON, TIBC, FERRITIN  No results found for: LIPASE    Radiology Results:   No results found

## 2023-03-08 NOTE — TELEPHONE ENCOUNTER
Scheduled date of combo  (as of today):4/12/23  Physician performing colonoscopy:Kristen  Location of colonoscopy:[facility]  Bowel prep reviewed with patient:Clenpiq  Instructions reviewed with patient by:DEMI  Clearances: no

## 2023-03-13 ENCOUNTER — APPOINTMENT (OUTPATIENT)
Dept: PHYSICAL THERAPY | Facility: REHABILITATION | Age: 53
End: 2023-03-13

## 2023-03-29 ENCOUNTER — TELEPHONE (OUTPATIENT)
Dept: GASTROENTEROLOGY | Facility: CLINIC | Age: 53
End: 2023-03-29

## 2023-03-29 DIAGNOSIS — Z12.12 SCREENING FOR COLORECTAL CANCER: Primary | ICD-10-CM

## 2023-03-29 DIAGNOSIS — Z12.11 SCREENING FOR COLORECTAL CANCER: Primary | ICD-10-CM

## 2023-03-29 RX ORDER — SODIUM PICOSULFATE, MAGNESIUM OXIDE, AND ANHYDROUS CITRIC ACID 10; 3.5; 12 MG/160ML; G/160ML; G/160ML
LIQUID ORAL
Qty: 320 ML | Refills: 0 | Status: SHIPPED | OUTPATIENT
Start: 2023-03-29

## 2023-03-29 NOTE — TELEPHONE ENCOUNTER
Procedure confirmed  Colonoscopy     Via: Corinne    Instructions given: Given to Patient at Visit     Prep Given: Clenpiq    Call the office if there are any questions

## 2023-06-09 LAB
ALBUMIN SERPL-MCNC: 4.7 G/DL (ref 3.6–5.1)
ALBUMIN/GLOB SERPL: 1.8 (CALC) (ref 1–2.5)
ALP SERPL-CCNC: 62 U/L (ref 37–153)
ALT SERPL-CCNC: 12 U/L (ref 6–29)
AST SERPL-CCNC: 15 U/L (ref 10–35)
BASOPHILS # BLD AUTO: 29 CELLS/UL (ref 0–200)
BASOPHILS NFR BLD AUTO: 0.8 %
BILIRUB SERPL-MCNC: 0.5 MG/DL (ref 0.2–1.2)
BUN SERPL-MCNC: 23 MG/DL (ref 7–25)
BUN/CREAT SERPL: NORMAL (CALC) (ref 6–22)
CALCIUM SERPL-MCNC: 9.1 MG/DL (ref 8.6–10.4)
CHLORIDE SERPL-SCNC: 107 MMOL/L (ref 98–110)
CHOLEST SERPL-MCNC: 210 MG/DL
CHOLEST/HDLC SERPL: 2.6 (CALC)
CO2 SERPL-SCNC: 25 MMOL/L (ref 20–32)
CREAT SERPL-MCNC: 0.63 MG/DL (ref 0.5–1.03)
EOSINOPHIL # BLD AUTO: 40 CELLS/UL (ref 15–500)
EOSINOPHIL NFR BLD AUTO: 1.1 %
ERYTHROCYTE [DISTWIDTH] IN BLOOD BY AUTOMATED COUNT: 13 % (ref 11–15)
GFR/BSA.PRED SERPLBLD CYS-BASED-ARV: 105 ML/MIN/1.73M2
GLOBULIN SER CALC-MCNC: 2.6 G/DL (CALC) (ref 1.9–3.7)
GLUCOSE SERPL-MCNC: 95 MG/DL (ref 65–99)
HBA1C MFR BLD: 5.3 % OF TOTAL HGB
HCT VFR BLD AUTO: 38.2 % (ref 35–45)
HDLC SERPL-MCNC: 80 MG/DL
HGB BLD-MCNC: 12.9 G/DL (ref 11.7–15.5)
LDLC SERPL CALC-MCNC: 114 MG/DL (CALC)
LYMPHOCYTES # BLD AUTO: 1336 CELLS/UL (ref 850–3900)
LYMPHOCYTES NFR BLD AUTO: 37.1 %
MCH RBC QN AUTO: 31.3 PG (ref 27–33)
MCHC RBC AUTO-ENTMCNC: 33.8 G/DL (ref 32–36)
MCV RBC AUTO: 92.7 FL (ref 80–100)
MONOCYTES # BLD AUTO: 238 CELLS/UL (ref 200–950)
MONOCYTES NFR BLD AUTO: 6.6 %
NEUTROPHILS # BLD AUTO: 1958 CELLS/UL (ref 1500–7800)
NEUTROPHILS NFR BLD AUTO: 54.4 %
NONHDLC SERPL-MCNC: 130 MG/DL (CALC)
PLATELET # BLD AUTO: 288 THOUSAND/UL (ref 140–400)
PMV BLD REES-ECKER: 10.4 FL (ref 7.5–12.5)
POTASSIUM SERPL-SCNC: 4.5 MMOL/L (ref 3.5–5.3)
PROT SERPL-MCNC: 7.3 G/DL (ref 6.1–8.1)
RBC # BLD AUTO: 4.12 MILLION/UL (ref 3.8–5.1)
SODIUM SERPL-SCNC: 139 MMOL/L (ref 135–146)
TRIGL SERPL-MCNC: 67 MG/DL
WBC # BLD AUTO: 3.6 THOUSAND/UL (ref 3.8–10.8)

## 2023-06-15 ENCOUNTER — ANNUAL EXAM (OUTPATIENT)
Dept: OBGYN CLINIC | Facility: CLINIC | Age: 53
End: 2023-06-15
Payer: COMMERCIAL

## 2023-06-15 VITALS
DIASTOLIC BLOOD PRESSURE: 72 MMHG | BODY MASS INDEX: 24.05 KG/M2 | HEIGHT: 70 IN | SYSTOLIC BLOOD PRESSURE: 110 MMHG | WEIGHT: 168 LBS

## 2023-06-15 DIAGNOSIS — N95.1 VASOMOTOR SYMPTOMS DUE TO MENOPAUSE: ICD-10-CM

## 2023-06-15 DIAGNOSIS — Z01.419 ROUTINE GYNECOLOGICAL EXAMINATION: ICD-10-CM

## 2023-06-15 DIAGNOSIS — Z12.31 ENCOUNTER FOR SCREENING MAMMOGRAM FOR MALIGNANT NEOPLASM OF BREAST: ICD-10-CM

## 2023-06-15 PROBLEM — R87.610 ASCUS WITH POSITIVE HIGH RISK HPV CERVICAL: Status: RESOLVED | Noted: 2019-05-01 | Resolved: 2023-06-15

## 2023-06-15 PROBLEM — R87.810 ASCUS WITH POSITIVE HIGH RISK HPV CERVICAL: Status: RESOLVED | Noted: 2019-05-01 | Resolved: 2023-06-15

## 2023-06-15 PROCEDURE — G0143 SCR C/V CYTO,THINLAYER,RESCR: HCPCS | Performed by: OBSTETRICS & GYNECOLOGY

## 2023-06-15 PROCEDURE — G0476 HPV COMBO ASSAY CA SCREEN: HCPCS | Performed by: OBSTETRICS & GYNECOLOGY

## 2023-06-15 PROCEDURE — 99396 PREV VISIT EST AGE 40-64: CPT | Performed by: OBSTETRICS & GYNECOLOGY

## 2023-06-15 RX ORDER — PROGESTERONE 100 MG/1
1 CAPSULE ORAL DAILY
Qty: 90 CAPSULE | Refills: 3 | Status: SHIPPED | OUTPATIENT
Start: 2023-06-15

## 2023-06-15 RX ORDER — ESTRADIOL 0.03 MG/D
1 FILM, EXTENDED RELEASE TRANSDERMAL 2 TIMES WEEKLY
Qty: 8 PATCH | Refills: 3 | Status: SHIPPED | OUTPATIENT
Start: 2023-06-15

## 2023-06-15 NOTE — PROGRESS NOTES
Lyn Contreras   1970    CC:  Yearly exam    S:  45 y o  female here for yearly exam  She is postmenopausal and has had no vaginal bleeding  She denies vaginal discharge, itching, odor or dryness  Has been doing  work - but will be moving to work at [address] as an / for [employer]  Sons are doing well - 18 and 22  Sexual activity: She is sexually active without pain, bleeding  She does not note dryness with intercourse  She does have occasional stress urinary incontinence, no other urinary concerns, bowel problems, breast concerns  Last Pap:    2/20/19 - ASCUS, other HR HPV positive, colpo benign  6/9/20 - NILM, other HR HPV positive, colpo benign  6/3/21 - ASCUS, HPV negative  6/9/22 - NILM, Neg HPV    Last Mammo: 9/9/22 - [facility] 1  Last Colonoscopy: 4/12/23 - 5yr recall     We reviewed ASCCP guidelines for Pap testing  Family hx of breast cancer:  Mother  Family hx of ovarian cancer: no  Family hx of colon cancer: no      Current Outpatient Medications:   •  Ca Carbonate-Mag Hydroxide (ROLAIDS PO), Take by mouth if needed, Disp: , Rfl:   •  Calcium Carb-Cholecalciferol (CALCIUM + D3) 600-200 MG-UNIT TABS, Take 1 tablet by mouth daily in the early morning, Disp: , Rfl:   •  calcium carbonate (TUMS) 500 mg chewable tablet, Chew 1 tablet if needed for indigestion or heartburn, Disp: , Rfl:   •  Cholecalciferol (VITAMIN D-1000 MAX ST PO), Take 2 tablets by mouth in the morning, Disp: , Rfl:   •  Loratadine (CLARITIN PO), Take by mouth, Disp: , Rfl:   •  LORazepam (ATIVAN) 0 5 mg tablet, Take 1 tablet (0 5 mg total) by mouth 2 (two) times a day as needed for anxiety, Disp: 30 tablet, Rfl: 0  •  MAGNESIUM GLUCONATE PO, Take 400 mg by mouth daily, Disp: , Rfl:   •  omeprazole (PriLOSEC) 40 MG capsule, Take 1 capsule (40 mg total) by mouth daily, Disp: 30 capsule, Rfl: 1     Patient Active Problem List   Diagnosis   • Rectocele   • Dense "breast   • ASCUS with positive high risk HPV cervical   • Vitamin D deficiency   • History of nephrolithiasis   • Panic anxiety syndrome   • Palpitations     Family History   Problem Relation Age of Onset   • Hyperlipidemia Mother    • Heart disease Mother    • Hypertension Mother    • Paget's disease of bone Mother 76   • Breast cancer Mother         paget's   • Thyroid disease Mother    • Arthritis Mother    • Cancer Mother    • Stroke Father    • Hypertension Father    • Prostate cancer Father 77   • Thyroid disease Father    • Melanoma Father    • Arthritis Father    • Cancer Father    • No Known Problems Brother    • Mental illness Brother    • No Known Problems Brother    • No Known Problems Son    • No Known Problems Son    • Esophageal cancer Maternal Grandfather    • Cancer Maternal Grandfather    • Diabetes Paternal Grandmother    • Mental illness Paternal Grandmother    • Lung cancer Paternal Grandfather    • Cancer Paternal Grandfather    • No Known Problems Maternal Aunt    • Anemia Family    • Arthritis Family    • Depression Family    • Anxiety disorder Family    • Diabetes Family    • Lung cancer Family    • Migraines Family    • Paget's disease of bone Family    • Thyroid disease Family    • Varicose Veins Family      Past Medical History:   Diagnosis Date   • Abnormal Pap smear of cervix    • Anemia    • External hemorrhoids    • Kidney stone    • Lyme disease 11/2004   • Migraine    • Varicose veins of both lower extremities       Review of Systems   Respiratory: Negative  Cardiovascular: Negative  Gastrointestinal: Negative for constipation and diarrhea  Genitourinary: Negative for difficulty urinating, pelvic pain, vaginal bleeding, vaginal discharge, itching or odor  O:  Blood pressure 110/72, height 5' 9 5\" (1 765 m), weight 76 2 kg (168 lb)      Patient appears well and is not in distress  Breasts are symmetrical without mass, tenderness, nipple discharge, skin changes or " adenopathy  Abdomen is soft and nontender without masses  External genitals are normal without lesions or rashes  Urethral meatus and urethra are normal  Bladder is normal to palpation  Vagina is normal without discharge or bleeding, generalized atrophic changes present   Cervix is normal without discharge or lesion  Uterus is normal, mobile, nontender without palpable mass  Adnexa are normal, nontender, without palpable mass  A:  Yearly exam      P:   Pap & HPV today, if normal will space out  Mammo ordered   Colon Cancer Screening up to date     HRT:  Will start - discussed r/b - she will let me know how things are going in a few weeks  Aware needs to have progesterone with her estradiol  Reviewed considerations of menopause, to call with any postmenopausal bleeding or other concerns  RTO one year for yearly exam or sooner as needed

## 2023-06-22 LAB
LAB AP GYN PRIMARY INTERPRETATION: NORMAL
Lab: NORMAL

## 2023-10-12 RX ORDER — ESTRADIOL 0.03 MG/D
1 FILM, EXTENDED RELEASE TRANSDERMAL 2 TIMES WEEKLY
Qty: 8 PATCH | Refills: 2 | Status: SHIPPED | OUTPATIENT
Start: 2023-10-12 | End: 2024-01-10

## 2023-11-02 ENCOUNTER — HOSPITAL ENCOUNTER (OUTPATIENT)
Dept: MAMMOGRAPHY | Facility: MEDICAL CENTER | Age: 53
Discharge: HOME | End: 2023-11-02
Payer: COMMERCIAL

## 2023-11-02 VITALS — BODY MASS INDEX: 24.05 KG/M2 | HEIGHT: 70 IN | WEIGHT: 168 LBS

## 2023-11-02 DIAGNOSIS — Z12.31 ENCOUNTER FOR SCREENING MAMMOGRAM FOR MALIGNANT NEOPLASM OF BREAST: ICD-10-CM

## 2023-11-02 PROCEDURE — 77063 BREAST TOMOSYNTHESIS BI: CPT

## 2023-11-02 PROCEDURE — 77067 SCR MAMMO BI INCL CAD: CPT

## 2023-12-09 ENCOUNTER — OFFICE VISIT (OUTPATIENT)
Dept: URGENT CARE | Facility: CLINIC | Age: 53
End: 2023-12-09
Payer: COMMERCIAL

## 2023-12-09 VITALS
OXYGEN SATURATION: 99 % | HEIGHT: 69 IN | HEART RATE: 79 BPM | BODY MASS INDEX: 24.81 KG/M2 | TEMPERATURE: 97.7 F | RESPIRATION RATE: 16 BRPM

## 2023-12-09 DIAGNOSIS — R05.1 ACUTE COUGH: Primary | ICD-10-CM

## 2023-12-09 PROCEDURE — 99213 OFFICE O/P EST LOW 20 MIN: CPT | Performed by: PREVENTIVE MEDICINE

## 2023-12-09 RX ORDER — METHYLPREDNISOLONE 4 MG/1
TABLET ORAL
Qty: 1 EACH | Refills: 0 | Status: SHIPPED | OUTPATIENT
Start: 2023-12-09

## 2023-12-09 RX ORDER — AZITHROMYCIN 250 MG/1
TABLET, FILM COATED ORAL
Qty: 6 TABLET | Refills: 0 | Status: SHIPPED | OUTPATIENT
Start: 2023-12-09 | End: 2023-12-13

## 2023-12-09 NOTE — PATIENT INSTRUCTIONS
You can also use some of your 's codeine cough medicine if you have difficulty sleeping at night. Increase your fluids.   Recheck 5 to 7 days if no improvement

## 2023-12-09 NOTE — PROGRESS NOTES
[facility]        NAME: Anitha Horn is a 52 y.o. female  : 1970    MRN: [id number]  DATE: 2023  TIME: 5:03 PM    Assessment and Plan   Acute cough [R05.1]  1. Acute cough              Patient Instructions       Follow up with PCP in 3-5 days. Proceed to  ER if symptoms worsen. Chief Complaint     Chief Complaint   Patient presents with    Cold Like Symptoms     Pt reports sinus congestion/pressure with tooth pain. C/o productive cough at times with yellow mucus. Negative at home Covid test this morning. Managing symptoms with Advil cold and sinus and Mucinex. Denies any known fever. History of Present Illness       Coughing x 3 weeks with no improvement in the cough. Now having some sinus pressure and pain on the teeth. Review of Systems   Review of Systems   HENT:  Positive for congestion and sinus pressure. Respiratory:  Positive for choking.           Current Medications       Current Outpatient Medications:     Calcium Carb-Cholecalciferol (CALCIUM + D3) 600-200 MG-UNIT TABS, Take 1 tablet by mouth daily in the early morning, Disp: , Rfl:     calcium carbonate (TUMS) 500 mg chewable tablet, Chew 1 tablet if needed for indigestion or heartburn, Disp: , Rfl:     Cholecalciferol (VITAMIN D-1000 MAX ST PO), Take 2 tablets by mouth in the morning, Disp: , Rfl:     estradiol (Vivelle-Dot) 0.025 MG/24HR, Place 1 patch on the skin 2 (two) times a week, Disp: 8 patch, Rfl: 2    Progesterone 100 MG CAPS, Take 1 capsule by mouth in the morning, Disp: 90 capsule, Rfl: 3    Ca Carbonate-Mag Hydroxide (ROLAIDS PO), Take by mouth if needed (Patient not taking: Reported on 2023), Disp: , Rfl:     estradiol (Vivelle-Dot) 0.025 MG/24HR, Place 1 patch on the skin 2 (two) times a week, Disp: 8 patch, Rfl: 3    Loratadine (CLARITIN PO), Take by mouth (Patient not taking: Reported on 2023), Disp: , Rfl:     LORazepam (ATIVAN) 0.5 mg tablet, Take 1 tablet (0.5 mg total) by mouth 2 (two) times a day as needed for anxiety (Patient not taking: Reported on 12/9/2023), Disp: 30 tablet, Rfl: 0    MAGNESIUM GLUCONATE PO, Take 400 mg by mouth daily (Patient not taking: Reported on 12/9/2023), Disp: , Rfl:     Current Allergies     Allergies as of 12/09/2023 - Reviewed 12/09/2023   Allergen Reaction Noted    Pollen extract  12/03/2013            The following portions of the patient's history were reviewed and updated as appropriate: allergies, current medications, past family history, past medical history, past social history, past surgical history and problem list.     Past Medical History:   Diagnosis Date    Abnormal Pap smear of cervix     Anemia     External hemorrhoids     Kidney stone     Lyme disease 11/2004    Migraine     Varicose veins of both lower extremities        Past Surgical History:   Procedure Laterality Date    COLONOSCOPY      DILATION AND CURETTAGE, DIAGNOSTIC / THERAPEUTIC      ENDOMETRIAL BIOPSY  10/21/2008    by suction    HEMORRHOID SURGERY      HYSTEROSCOPY W/ ENDOMETRIAL ABLATION  05/24/2010    POLYPECTOMY      enteroscopic    WISDOM TOOTH EXTRACTION         Family History   Problem Relation Age of Onset    Hyperlipidemia Mother     Heart disease Mother     Hypertension Mother     Paget's disease of bone Mother 76    Breast cancer Mother         paget's    Thyroid disease Mother     Arthritis Mother     Cancer Mother     Stroke Father     Hypertension Father     Prostate cancer Father 77    Thyroid disease Father     Melanoma Father     Arthritis Father     Cancer Father     No Known Problems Brother     Mental illness Brother     No Known Problems Brother     No Known Problems Son     No Known Problems Son     Esophageal cancer Maternal Grandfather     Cancer Maternal Grandfather     Diabetes Paternal Grandmother     Mental illness Paternal Grandmother     Lung cancer Paternal Grandfather     Cancer Paternal Grandfather     No Known Problems Maternal Aunt Anemia Family     Arthritis Family     Depression Family     Anxiety disorder Family     Diabetes Family     Lung cancer Family     Migraines Family     Paget's disease of bone Family     Thyroid disease Family     Varicose Veins Family          Medications have been verified. Objective   Pulse 79   Temp 97.7 °F (36.5 °C)   Resp 16   Ht 5' 9" (1.753 m)   LMP  (LMP Unknown)   SpO2 99%   BMI 24.81 kg/m²   No LMP recorded (lmp unknown). Patient is postmenopausal.       Physical Exam     Physical Exam  Pulmonary:      Comments: Rhonchi and slight end expiratory wheezes superior aspect right lower lobe.   Rest of lung exam normal

## 2024-01-03 DIAGNOSIS — N95.1 VASOMOTOR SYMPTOMS DUE TO MENOPAUSE: ICD-10-CM

## 2024-01-03 RX ORDER — ESTRADIOL 0.03 MG/D
1 FILM, EXTENDED RELEASE TRANSDERMAL 2 TIMES WEEKLY
Qty: 8 PATCH | Refills: 0 | Status: SHIPPED | OUTPATIENT
Start: 2024-01-04 | End: 2024-04-03

## 2024-01-23 ENCOUNTER — OFFICE VISIT (OUTPATIENT)
Dept: FAMILY MEDICINE CLINIC | Facility: HOSPITAL | Age: 54
End: 2024-01-23
Payer: COMMERCIAL

## 2024-01-23 VITALS
DIASTOLIC BLOOD PRESSURE: 78 MMHG | HEIGHT: 69 IN | BODY MASS INDEX: 25.98 KG/M2 | TEMPERATURE: 98.3 F | HEART RATE: 77 BPM | SYSTOLIC BLOOD PRESSURE: 122 MMHG | WEIGHT: 175.4 LBS

## 2024-01-23 DIAGNOSIS — H69.93 EUSTACHIAN TUBE DYSFUNCTION, BILATERAL: ICD-10-CM

## 2024-01-23 DIAGNOSIS — J06.9 VIRAL UPPER RESPIRATORY TRACT INFECTION: Primary | ICD-10-CM

## 2024-01-23 PROCEDURE — 99213 OFFICE O/P EST LOW 20 MIN: CPT | Performed by: NURSE PRACTITIONER

## 2024-01-23 NOTE — PROGRESS NOTES
Name: Lala Neely      : 1970      MRN: [id number]  Encounter Provider: DARIUS Valle  Encounter Date: 2024   Encounter department: [facility] [address]     Assessment & Plan     1. Viral upper respiratory tract infection    2. Eustachian tube dysfunction, bilateral      Given short duration, advise she continue to treat sx's conservatively w/OTC sinus meds prn, start daily flonase spray, push fluids & diet as tolerated  Reviewed worse/persistent sx's to call with       Subjective      Hasn't felt well x5 days. Woke in the middle of the night with worse right ear pain. Using saline nasal spray without relief.   Covid test was negative this morning.         Review of Systems   Constitutional:  Negative for chills and fever.   HENT:  Positive for congestion, ear pain (right ear), postnasal drip and sinus pressure.    Respiratory:  Negative for cough.    Neurological:  Positive for headaches.       Current Outpatient Medications on File Prior to Visit   Medication Sig    Cholecalciferol (VITAMIN D-1000 MAX ST PO) Take 2 tablets by mouth in the morning    estradiol (Vivelle-Dot) 0.025 MG/24HR Place 1 patch on the skin 2 (two) times a week    Progesterone 100 MG CAPS Take 1 capsule by mouth in the morning    Ca Carbonate-Mag Hydroxide (ROLAIDS PO) Take by mouth if needed (Patient not taking: Reported on 2023)    Calcium Carb-Cholecalciferol (CALCIUM + D3) 600-200 MG-UNIT TABS Take 1 tablet by mouth daily in the early morning    calcium carbonate (TUMS) 500 mg chewable tablet Chew 1 tablet if needed for indigestion or heartburn    estradiol (Vivelle-Dot) 0.025 MG/24HR Place 1 patch on the skin 2 (two) times a week    Loratadine (CLARITIN PO) Take by mouth (Patient not taking: Reported on 2023)    LORazepam (ATIVAN) 0.5 mg tablet Take 1 tablet (0.5 mg total) by mouth 2 (two) times a day as needed for anxiety (Patient not taking: Reported on 2023)    MAGNESIUM  "GLUCONATE PO Take 400 mg by mouth daily (Patient not taking: Reported on 12/9/2023)    methylPREDNISolone 4 MG tablet therapy pack Use as directed on package (Patient not taking: Reported on 1/23/2024)       Objective     /78   Pulse 77   Temp 98.3 °F (36.8 °C)   Ht 5' 9\" (1.753 m)   Wt 79.6 kg (175 lb 6.4 oz)   LMP  (LMP Unknown)   BMI 25.90 kg/m²       Physical Exam  Vitals reviewed.   Constitutional:       General: She is not in acute distress.     Appearance: Normal appearance.   HENT:      Head: Normocephalic.      Right Ear: A middle ear effusion is present. Tympanic membrane is not erythematous.      Left Ear: A middle ear effusion is present. Tympanic membrane is not erythematous.      Nose: Nose normal.      Right Sinus: No maxillary sinus tenderness or frontal sinus tenderness.      Left Sinus: No maxillary sinus tenderness or frontal sinus tenderness.      Mouth/Throat:      Mouth: Mucous membranes are moist.      Pharynx: Oropharynx is clear. No posterior oropharyngeal erythema.   Pulmonary:      Effort: Pulmonary effort is normal. No respiratory distress.      Comments: No cough  Musculoskeletal:      Cervical back: Normal range of motion.   Lymphadenopathy:      Head:      Right side of head: Submandibular adenopathy present.      Left side of head: Submandibular adenopathy present.      Cervical: No cervical adenopathy.   Skin:     General: Skin is warm and dry.   Neurological:      General: No focal deficit present.      Mental Status: She is alert and oriented to person, place, and time.   Psychiatric:         Mood and Affect: Mood normal.         Behavior: Behavior normal.         DARIUS Valle    "

## 2024-01-29 DIAGNOSIS — N95.1 VASOMOTOR SYMPTOMS DUE TO MENOPAUSE: Primary | ICD-10-CM

## 2024-01-31 RX ORDER — ESTRADIOL 0.03 MG/D
1 FILM, EXTENDED RELEASE TRANSDERMAL 2 TIMES WEEKLY
Qty: 24 PATCH | Refills: 0 | Status: SHIPPED | OUTPATIENT
Start: 2024-02-01

## 2024-03-05 ENCOUNTER — OFFICE VISIT (OUTPATIENT)
Dept: FAMILY MEDICINE CLINIC | Facility: HOSPITAL | Age: 54
End: 2024-03-05
Payer: COMMERCIAL

## 2024-03-05 VITALS
WEIGHT: 175.6 LBS | HEIGHT: 70 IN | DIASTOLIC BLOOD PRESSURE: 85 MMHG | BODY MASS INDEX: 25.14 KG/M2 | OXYGEN SATURATION: 97 % | SYSTOLIC BLOOD PRESSURE: 128 MMHG | HEART RATE: 74 BPM | TEMPERATURE: 98.2 F

## 2024-03-05 DIAGNOSIS — Z00.00 ANNUAL PHYSICAL EXAM: Primary | ICD-10-CM

## 2024-03-05 DIAGNOSIS — Z23 ENCOUNTER FOR IMMUNIZATION: ICD-10-CM

## 2024-03-05 DIAGNOSIS — R73.9 HYPERGLYCEMIA: ICD-10-CM

## 2024-03-05 DIAGNOSIS — E78.5 DYSLIPIDEMIA: ICD-10-CM

## 2024-03-05 PROCEDURE — 90471 IMMUNIZATION ADMIN: CPT

## 2024-03-05 PROCEDURE — 90715 TDAP VACCINE 7 YRS/> IM: CPT

## 2024-03-05 PROCEDURE — 99396 PREV VISIT EST AGE 40-64: CPT | Performed by: INTERNAL MEDICINE

## 2024-03-05 RX ORDER — ZINC OXIDE 13 %
1 CREAM (GRAM) TOPICAL DAILY
COMMUNITY

## 2024-03-06 NOTE — PROGRESS NOTES
ADULT ANNUAL PHYSICAL  [facility] [address]     NAME: Lala Neely  AGE: 53 y.o. SEX: female  : 1970     DATE: 3/5/2024     Assessment and Plan:     Problem List Items Addressed This Visit    None  Visit Diagnoses       Annual physical exam    -  Primary    Relevant Orders    CBC and differential    Comprehensive metabolic panel    Lipid panel    TSH, 3rd generation with Free T4 reflex    Hemoglobin A1C With EAG    Encounter for immunization        Relevant Orders    TDAP VACCINE GREATER THAN OR EQUAL TO 6YO IM (Completed)    BMI 25.0-25.9,adult        healthy diet and regular exercise encouraged    Relevant Orders    CBC and differential    Comprehensive metabolic panel    Lipid panel    TSH, 3rd generation with Free T4 reflex    Hemoglobin A1C With EAG    Hyperglycemia        Last FBS/A1c wnl, recheck annually - BW order given, healthy diet/regular exercise encouraged, will follow    Relevant Orders    Comprehensive metabolic panel    Hemoglobin A1C With EAG    Dyslipidemia        Mild elevation in TC & LDL, diet/exercise/wgt loss encouraged, recheck FLP annually - BW order given, will follow    Relevant Orders    Lipid panel            Immunizations and preventive care screenings were discussed with patient today. Appropriate education was printed on patient's after visit summary.    Counseling:  Alcohol/drug use: discussed moderation in alcohol intake, the recommendations for healthy alcohol use, and avoidance of illicit drug use.  Dental Health: discussed importance of regular tooth brushing, flossing, and dental visits.  Injury prevention: discussed safety/seat belts, safety helmets, smoke detectors, carbon dioxide detectors, and smoking near bedding or upholstery.  Exercise: the importance of regular exercise/physical activity was discussed. Recommend exercise 3-5 times per week for at least 30 minutes.   Cervical cancer screening: PAP  6/23  Breast cancer screening: Mammo 11/23  Colon cancer screening: Colonoscopy 4/23 - 5 yrs      Depression Screening and Follow-up Plan: Patient was screened for depression during today's encounter. They screened negative with a PHQ-2 score of 0.    BW 6/23    Return in about 1 year (around 3/6/2025) for Annual physical.     Chief Complaint:     Chief Complaint   Patient presents with    Physical Exam     Wanted to know if she should have labs redone, states her numbers were off.   States she was also having issues with feeling full(indgestion) all the time, states she had a scope done and had a hernia which shouldn't make a difference, started a pro-biotic which has really helped her systems.       History of Present Illness:     Adult Annual Physical   Patient here for a comprehensive physical exam. The patient reports no problems.    She has been on a probiotic the past 3 - almost 4 mos - and notes it has helped her abd fullness. She had a recent colonoscopy and EGD and only a small HH was noted.  She notes no swallowing issues/N/V/blood in stool/black stools/unintentional wgt loss.     Diet and Physical Activity  Diet/Nutrition: well balanced diet, limited junk food, and limited fruits/vegetables.   Exercise:  Gym 2-3 x's a week and is on elliptical and then does strength training a few days as .   BMI reviewed - wgt up 9 lbs from Nov 22     Depression Screening  PHQ-2/9 Depression Screening    Little interest or pleasure in doing things: 0 - not at all  Feeling down, depressed, or hopeless: 0 - not at all  PHQ-2 Score: 0  PHQ-2 Interpretation: Negative depression screen       General Health  Sleep: sleeps well.   Hearing: normal - none .  Vision: vision problems: cataract in R eye and wears glasses.   Dental: regular dental visits and brushes teeth twice daily.       /GYN Health  Follows with gynecology? yes - Dr. Екатерина Barber   Patient is: postmenopausal  Last menstrual period:  postmenopausal  Contraceptive method: menopause.  PAP 6/23  Mammo 11/23    Advanced Care Planning  Do you have an advanced directive? yes  Do you have a durable medical power of ? yes  ACP document given to the patient? no     Review of Systems:     Review of Systems   Constitutional:  Negative for chills, fever and unexpected weight change.   HENT:  Negative for congestion, hearing loss and trouble swallowing.    Eyes:  Negative for pain and visual disturbance.        Cataract in R eye they are keeping an eye on   Respiratory:  Negative for cough, shortness of breath and wheezing.    Cardiovascular:  Negative for chest pain, palpitations and leg swelling.   Gastrointestinal:  Negative for abdominal pain, blood in stool, constipation, diarrhea, nausea and vomiting.   Genitourinary:  Negative for difficulty urinating, dysuria, vaginal bleeding and vaginal pain.   Musculoskeletal:  Positive for arthralgias. Negative for gait problem and joint swelling.   Skin:  Negative for rash and wound.   Neurological:  Negative for dizziness, light-headedness and headaches.   Hematological:  Does not bruise/bleed easily.   Psychiatric/Behavioral:  Negative for dysphoric mood and sleep disturbance. The patient is not nervous/anxious.       Past Medical History:     Past Medical History:   Diagnosis Date    Anemia     External hemorrhoids     Lyme disease 11/2004    Migraine     Varicose veins of both lower extremities       Past Surgical History:     Past Surgical History:   Procedure Laterality Date    COLONOSCOPY      DILATION AND CURETTAGE, DIAGNOSTIC / THERAPEUTIC      ENDOMETRIAL BIOPSY  10/21/2008    by suction    HEMORRHOID SURGERY      HYSTEROSCOPY W/ ENDOMETRIAL ABLATION  05/24/2010    POLYPECTOMY      enteroscopic    WISDOM TOOTH EXTRACTION        Social History:     Social History     Socioeconomic History    Marital status: /Civil Union     Spouse name: None    Number of children: None    Years of  education: None    Highest education level: None   Occupational History    Occupation:    Tobacco Use    Smoking status: Never    Smokeless tobacco: Never   Vaping Use    Vaping status: Never Used   Substance and Sexual Activity    Alcohol use: Yes     Alcohol/week: 3.0 standard drinks of alcohol     Types: 3 Standard drinks or equivalent per week     Comment: social use     Drug use: No    Sexual activity: Yes     Partners: Male     Birth control/protection: Post-menopausal, Male Sterilization     Comment: partner had vasectomy    Other Topics Concern    None   Social History Narrative    1 cup of coffee daily     exercising regularly      Social Determinants of Health     Financial Resource Strain: Not on file   Food Insecurity: Not on file   Transportation Needs: Not on file   Physical Activity: Not on file   Stress: Not on file   Social Connections: Not on file   Intimate Partner Violence: Not on file   Housing Stability: Not on file      Family History:     Family History   Problem Relation Age of Onset    Hyperlipidemia Mother     Heart disease Mother     Hypertension Mother     Paget's disease of bone Mother 75    Breast cancer Mother 73        paget's    Thyroid disease Mother     Arthritis Mother     Cancer Mother     Stroke Father     Hypertension Father     Prostate cancer Father 66    Thyroid disease Father     Melanoma Father     Arthritis Father     Cancer Father     No Known Problems Brother     Mental illness Brother     No Known Problems Brother     No Known Problems Son     No Known Problems Son     Esophageal cancer Maternal Grandfather     Cancer Maternal Grandfather     Diabetes Paternal Grandmother     Mental illness Paternal Grandmother     Lung cancer Paternal Grandfather     Cancer Paternal Grandfather     No Known Problems Maternal Aunt     Anemia Family     Arthritis Family     Depression Family     Anxiety disorder Family     Diabetes Family     Lung cancer Family     Migraines  "Family     Paget's disease of bone Family     Thyroid disease Family     Varicose Veins Family       Current Medications:     Current Outpatient Medications   Medication Sig Dispense Refill    Ca Carbonate-Mag Hydroxide (ROLAIDS PO) Take by mouth if needed      Loratadine (CLARITIN PO) Take by mouth      LORazepam (ATIVAN) 0.5 mg tablet Take 1 tablet (0.5 mg total) by mouth 2 (two) times a day as needed for anxiety 30 tablet 0    Probiotic Product (Probiotic Daily) CAPS Take 1 capsule by mouth in the morning      Cholecalciferol (VITAMIN D-1000 MAX ST PO) Take 2 tablets by mouth in the morning      estradiol (Vivelle-Dot) 0.025 MG/24HR Place 1 patch on the skin 2 (two) times a week 24 patch 0    Progesterone 100 MG CAPS Take 1 capsule by mouth in the morning 90 capsule 3     No current facility-administered medications for this visit.      Allergies:     Allergies   Allergen Reactions    Pollen Extract       Physical Exam:     /85 (BP Location: Left arm, Patient Position: Sitting, Cuff Size: Standard)   Pulse 74   Temp 98.2 °F (36.8 °C) (Tympanic)   Ht 5' 9.5\" (1.765 m)   Wt 79.7 kg (175 lb 9.6 oz)   LMP  (LMP Unknown)   SpO2 97%   BMI 25.56 kg/m²     Physical Exam  Vitals and nursing note reviewed.   Constitutional:       General: She is not in acute distress.     Appearance: She is well-developed. She is not ill-appearing.   HENT:      Head: Normocephalic and atraumatic.      Right Ear: Tympanic membrane and external ear normal. There is no impacted cerumen.      Left Ear: Tympanic membrane and external ear normal. There is no impacted cerumen.      Mouth/Throat:      Mouth: Mucous membranes are moist.      Pharynx: Oropharynx is clear. No oropharyngeal exudate.   Eyes:      General:         Right eye: No discharge.         Left eye: No discharge.      Conjunctiva/sclera: Conjunctivae normal.   Neck:      Thyroid: No thyromegaly.      Trachea: No tracheal deviation.   Cardiovascular:      Rate and " Rhythm: Normal rate and regular rhythm.      Heart sounds: Normal heart sounds. No murmur heard.  Pulmonary:      Effort: Pulmonary effort is normal. No respiratory distress.      Breath sounds: Normal breath sounds. No wheezing, rhonchi or rales.   Abdominal:      General: There is no distension.      Palpations: Abdomen is soft.      Tenderness: There is no abdominal tenderness. There is no guarding or rebound.   Musculoskeletal:         General: No deformity or signs of injury.      Cervical back: Neck supple.   Lymphadenopathy:      Cervical: No cervical adenopathy.   Skin:     General: Skin is warm and dry.      Coloration: Skin is not pale.      Findings: No bruising or rash.   Neurological:      General: No focal deficit present.      Mental Status: She is alert. Mental status is at baseline.      Motor: No abnormal muscle tone.      Gait: Gait normal.   Psychiatric:         Mood and Affect: Mood normal.         Behavior: Behavior normal.         Thought Content: Thought content normal.         Judgment: Judgment normal.          Deneen Hurley DO  [facility] [id number]

## 2024-03-21 ENCOUNTER — TRANSCRIBE ORDERS (OUTPATIENT)
Dept: LAB | Facility: CLINIC | Age: 54
End: 2024-03-21

## 2024-03-21 DIAGNOSIS — Z13.9 SCREENING FOR UNSPECIFIED CONDITION: Primary | ICD-10-CM

## 2024-03-27 ENCOUNTER — APPOINTMENT (OUTPATIENT)
Dept: LAB | Facility: CLINIC | Age: 54
End: 2024-03-27

## 2024-03-27 LAB
ALBUMIN SERPL BCP-MCNC: 4.7 G/DL (ref 3.5–5)
ALP SERPL-CCNC: 50 U/L (ref 34–104)
ALT SERPL W P-5'-P-CCNC: 11 U/L (ref 7–52)
ANION GAP SERPL CALCULATED.3IONS-SCNC: 9 MMOL/L (ref 4–13)
AST SERPL W P-5'-P-CCNC: 15 U/L (ref 13–39)
BILIRUB SERPL-MCNC: 0.63 MG/DL (ref 0.2–1)
BUN SERPL-MCNC: 20 MG/DL (ref 5–25)
CALCIUM SERPL-MCNC: 9 MG/DL (ref 8.4–10.2)
CHLORIDE SERPL-SCNC: 104 MMOL/L (ref 96–108)
CHOLEST SERPL-MCNC: 172 MG/DL
CO2 SERPL-SCNC: 27 MMOL/L (ref 21–32)
CREAT SERPL-MCNC: 0.63 MG/DL (ref 0.6–1.3)
GFR SERPL CREATININE-BSD FRML MDRD: 101 ML/MIN/1.73SQ M
GLUCOSE P FAST SERPL-MCNC: 99 MG/DL (ref 65–99)
HDLC SERPL-MCNC: 68 MG/DL
LDLC SERPL CALC-MCNC: 90 MG/DL (ref 0–100)
NONHDLC SERPL-MCNC: 104 MG/DL
POTASSIUM SERPL-SCNC: 4.6 MMOL/L (ref 3.5–5.3)
PROT SERPL-MCNC: 7.1 G/DL (ref 6.4–8.4)
SODIUM SERPL-SCNC: 140 MMOL/L (ref 135–147)
TRIGL SERPL-MCNC: 68 MG/DL

## 2024-03-27 PROCEDURE — 36415 COLL VENOUS BLD VENIPUNCTURE: CPT

## 2024-03-27 PROCEDURE — 80061 LIPID PANEL: CPT

## 2024-03-27 PROCEDURE — 80053 COMPREHEN METABOLIC PANEL: CPT

## 2024-04-23 ENCOUNTER — NURSE TRIAGE (OUTPATIENT)
Age: 54
End: 2024-04-23

## 2024-04-23 DIAGNOSIS — B37.2 YEAST DERMATITIS: Primary | ICD-10-CM

## 2024-04-23 RX ORDER — FLUCONAZOLE 150 MG/1
150 TABLET ORAL
Qty: 2 TABLET | Refills: 0 | Status: SHIPPED | OUTPATIENT
Start: 2024-04-23 | End: 2024-04-27

## 2024-04-23 NOTE — TELEPHONE ENCOUNTER
Regarding: pt yeast infection past 6 days  ----- Message from Suzan Lama sent at 4/23/2024  7:48 AM EDT -----  Patient called and started with a yeast infection last Wednesday and she is using over the counter and it is not getting completely better. Please call patient back at [phone]. Thank you

## 2024-04-23 NOTE — TELEPHONE ENCOUNTER
Patient began 1 wk ago with internal and external I&b. Took monostat  one and has seen some improvement. External irritation continues and using monostat cream. No odor  States still wet and uncomfortable.   Requesting possible prescription  to ra/ht  Has annual scheduled 6/20/24   [Respiratory Effort] : normal respiratory effort [Alert] : alert [Calm] : calm [JVD] : no jugular venous distention  [Purpura] : no purpura  [de-identified] : Normal [de-identified] : Normal [de-identified] : 5 mm nodule in the right arm leg [de-identified] : Normal

## 2024-04-23 NOTE — TELEPHONE ENCOUNTER
Called patient and informed her of diflucan being sent to pharmacy and instructions.  She verbalized understanding and voiced appreciation for phone call.

## 2024-04-24 DIAGNOSIS — N95.1 VASOMOTOR SYMPTOMS DUE TO MENOPAUSE: ICD-10-CM

## 2024-04-24 RX ORDER — ESTRADIOL 0.03 MG/D
1 FILM, EXTENDED RELEASE TRANSDERMAL 2 TIMES WEEKLY
Qty: 8 PATCH | Refills: 5 | Status: SHIPPED | OUTPATIENT
Start: 2024-04-25

## 2024-06-18 DIAGNOSIS — Z00.6 ENCOUNTER FOR EXAMINATION FOR NORMAL COMPARISON OR CONTROL IN CLINICAL RESEARCH PROGRAM: ICD-10-CM

## 2024-06-20 ENCOUNTER — ANNUAL EXAM (OUTPATIENT)
Age: 54
End: 2024-06-20
Payer: COMMERCIAL

## 2024-06-20 VITALS
HEIGHT: 70 IN | BODY MASS INDEX: 24.97 KG/M2 | WEIGHT: 174.4 LBS | DIASTOLIC BLOOD PRESSURE: 76 MMHG | SYSTOLIC BLOOD PRESSURE: 116 MMHG

## 2024-06-20 DIAGNOSIS — N95.1 VASOMOTOR SYMPTOMS DUE TO MENOPAUSE: ICD-10-CM

## 2024-06-20 DIAGNOSIS — Z01.419 ENCOUNTER FOR ANNUAL ROUTINE GYNECOLOGICAL EXAMINATION: Primary | ICD-10-CM

## 2024-06-20 DIAGNOSIS — Z12.31 SCREENING MAMMOGRAM FOR BREAST CANCER: ICD-10-CM

## 2024-06-20 PROCEDURE — S0612 ANNUAL GYNECOLOGICAL EXAMINA: HCPCS | Performed by: OBSTETRICS & GYNECOLOGY

## 2024-06-20 RX ORDER — PROGESTERONE 100 MG/1
1 CAPSULE ORAL DAILY
Qty: 90 CAPSULE | Refills: 3 | Status: SHIPPED | OUTPATIENT
Start: 2024-06-20

## 2024-06-20 RX ORDER — ESTRADIOL 0.04 MG/D
1 PATCH, EXTENDED RELEASE TRANSDERMAL 2 TIMES WEEKLY
Qty: 24 PATCH | Refills: 3 | Status: SHIPPED | OUTPATIENT
Start: 2024-06-20

## 2024-06-20 NOTE — PROGRESS NOTES
Lala Neely   1970    CC:  Yearly exam    S:  53 y.o. female here for yearly exam. She is postmenopausal and has had no vaginal bleeding.  She denies vaginal discharge, itching, odor or dryness.     HRT - would like to adjust dosing, some of symptoms that initially improved have now worsened slightly.  Will increase estrogen dosing slightly.     Sexual activity: She is sexually active without pain, bleeding.  Dryness has improved with use of HRT.       She does not report bothersome urinary incontinence, urinary concerns, bowel problems, breast concerns.     Last Pap: 6/15/23 - NILM, Neg HPV (for 3yr repeat)  Last Mammo: 11/2/23 - BIRad 1  Last Colonoscopy: 4/12/23 - 5yr recall     We reviewed ASCCP guidelines for Pap testing.     Family hx of breast cancer: MOther  Family hx of ovarian cancer: no  Family hx of colon cancer: no      Current Outpatient Medications:     Ca Carbonate-Mag Hydroxide (ROLAIDS PO), Take by mouth if needed, Disp: , Rfl:     Cholecalciferol (VITAMIN D-1000 MAX ST PO), Take 2 tablets by mouth in the morning, Disp: , Rfl:     estradiol (Vivelle-Dot) 0.025 MG/24HR, Place 1 patch on the skin 2 (two) times a week, Disp: 8 patch, Rfl: 5    Loratadine (CLARITIN PO), Take by mouth, Disp: , Rfl:     LORazepam (ATIVAN) 0.5 mg tablet, Take 1 tablet (0.5 mg total) by mouth 2 (two) times a day as needed for anxiety, Disp: 30 tablet, Rfl: 0    Probiotic Product (Probiotic Daily) CAPS, Take 1 capsule by mouth in the morning, Disp: , Rfl:     Progesterone 100 MG CAPS, Take 1 capsule by mouth in the morning, Disp: 90 capsule, Rfl: 3  Patient Active Problem List   Diagnosis    Rectocele    Dense breast    Vitamin D deficiency    History of nephrolithiasis    Panic anxiety syndrome    Palpitations     Family History   Problem Relation Age of Onset    Hyperlipidemia Mother     Heart disease Mother     Hypertension Mother     Paget's disease of bone Mother 75    Breast cancer Mother 73        paget's     "Thyroid disease Mother     Arthritis Mother     Cancer Mother     Stroke Father     Hypertension Father     Prostate cancer Father 66    Thyroid disease Father     Melanoma Father     Arthritis Father     Cancer Father     No Known Problems Brother     Mental illness Brother     No Known Problems Brother     No Known Problems Son     No Known Problems Son     Cancer Maternal Grandmother     Esophageal cancer Maternal Grandfather     Cancer Maternal Grandfather     Diabetes Paternal Grandmother     Mental illness Paternal Grandmother     Lung cancer Paternal Grandfather     Cancer Paternal Grandfather     No Known Problems Maternal Aunt     Anemia Family     Arthritis Family     Depression Family     Anxiety disorder Family     Diabetes Family     Lung cancer Family     Migraines Family     Paget's disease of bone Family     Thyroid disease Family     Varicose Veins Family      Past Medical History:   Diagnosis Date    Abnormal Pap smear of cervix     Anemia     External hemorrhoids     Kidney stone     Lyme disease 11/2004    Migraine     Varicose veins of both lower extremities         Review of Systems   Respiratory: Negative.    Cardiovascular: Negative.    Gastrointestinal: Negative for constipation and diarrhea.   Genitourinary: Negative for difficulty urinating, pelvic pain, vaginal bleeding, vaginal discharge, itching or odor.    O:  Blood pressure 116/76, height 5' 9.5\" (1.765 m), weight 79.1 kg (174 lb 6.4 oz).    Patient appears well and is not in distress  Breasts are symmetrical without mass, tenderness, nipple discharge, skin changes or adenopathy.   Abdomen is soft and nontender without masses.   External genitals are normal without lesions or rashes.  Urethral meatus and urethra are normal  Bladder is normal to palpation  Vagina is normal without discharge or bleeding, generalized atrophic changes present   Cervix is normal without discharge or lesion.   Uterus is normal, mobile, nontender without " palpable mass.  Adnexa are normal, nontender, without palpable mass.     A:  Yearly exam.     P:   Pap & HPV due 2026  Mammo up to date, ordered   Colon Cancer Screening up to date     Reviewed considerations of menopause, to call with any postmenopausal bleeding or other concerns.      RTO one year for yearly exam or sooner as needed.

## 2024-08-20 DIAGNOSIS — F41.0 PANIC ANXIETY SYNDROME: ICD-10-CM

## 2024-08-21 RX ORDER — LORAZEPAM 0.5 MG/1
0.5 TABLET ORAL 2 TIMES DAILY PRN
Qty: 30 TABLET | Refills: 0 | Status: SHIPPED | OUTPATIENT
Start: 2024-08-21

## 2024-08-21 NOTE — TELEPHONE ENCOUNTER
Requested medication(s) are due for refill today: No  Patient has already received a courtesy refill: No  Other reason request has been forwarded to provider: looks like this was last prescribed in 2022. Please advise, thank you

## 2024-11-15 ENCOUNTER — HOSPITAL ENCOUNTER (OUTPATIENT)
Dept: MAMMOGRAPHY | Facility: MEDICAL CENTER | Age: 54
Discharge: HOME | End: 2024-11-15
Payer: COMMERCIAL

## 2024-11-15 VITALS — BODY MASS INDEX: 24.91 KG/M2 | WEIGHT: 174 LBS | HEIGHT: 70 IN

## 2024-11-15 DIAGNOSIS — Z12.31 SCREENING MAMMOGRAM FOR BREAST CANCER: ICD-10-CM

## 2024-11-15 PROCEDURE — 77063 BREAST TOMOSYNTHESIS BI: CPT

## 2024-11-15 PROCEDURE — 77067 SCR MAMMO BI INCL CAD: CPT

## 2025-01-02 ENCOUNTER — OFFICE VISIT (OUTPATIENT)
Dept: URGENT CARE | Facility: CLINIC | Age: 55
End: 2025-01-02
Payer: COMMERCIAL

## 2025-01-02 VITALS
DIASTOLIC BLOOD PRESSURE: 78 MMHG | TEMPERATURE: 98.8 F | HEIGHT: 69 IN | OXYGEN SATURATION: 97 % | RESPIRATION RATE: 18 BRPM | HEART RATE: 70 BPM | BODY MASS INDEX: 26.1 KG/M2 | SYSTOLIC BLOOD PRESSURE: 122 MMHG | WEIGHT: 176.2 LBS

## 2025-01-02 DIAGNOSIS — B34.9 ACUTE VIRAL SYNDROME: Primary | ICD-10-CM

## 2025-01-02 PROCEDURE — S9083 URGENT CARE CENTER GLOBAL: HCPCS

## 2025-01-02 PROCEDURE — G0382 LEV 3 HOSP TYPE B ED VISIT: HCPCS

## 2025-01-02 RX ORDER — METHYLPREDNISOLONE 4 MG/1
TABLET ORAL
Qty: 21 TABLET | Refills: 0 | Status: SHIPPED | OUTPATIENT
Start: 2025-01-02

## 2025-01-02 RX ORDER — BENZONATATE 200 MG/1
200 CAPSULE ORAL 3 TIMES DAILY PRN
Qty: 20 CAPSULE | Refills: 0 | Status: SHIPPED | OUTPATIENT
Start: 2025-01-02 | End: 2025-01-09

## 2025-01-02 NOTE — PATIENT INSTRUCTIONS
Saline nasal spray as often as needed in each nostril  Flonase nasal spray 1 spray to each nostril daily  Mucinex in the blue box as directed    Increase your fluids and rest  Take the Tessalon Perles as needed for cough  Take the steroids as directed    If you develop chest pain or shortness of breath go to the ER

## 2025-01-02 NOTE — PROGRESS NOTES
The nasal spray as often as needed in each nostril  [facility]        NAME: Lala Neely is a 54 y.o. female  : 1970    MRN: [id number]  DATE: 2025  TIME: 10:30 AM    Assessment and Plan   Acute viral syndrome [B34.9]  1. Acute viral syndrome  methylPREDNISolone 4 MG tablet therapy pack    benzonatate (TESSALON) 200 MG capsule            Patient Instructions   Saline nasal spray as often as needed in each nostril  Flonase nasal spray 1 spray to each nostril daily  Mucinex in the blue box as directed    Increase your fluids and rest  Take the Tessalon Perles as needed for cough  Take the steroids as directed    If you develop chest pain or shortness of breath go to the ER    Follow up with PCP in 3-5 days.  Proceed to  ER if symptoms worsen.    If tests have been performed at [facility], our office will contact you with results if changes need to be made to the care plan discussed with you at the visit.  [facility]    Chief Complaint     Chief Complaint   Patient presents with    Cough     Started  with intermittent ear pain in both ears, dry cough, low-grade fever, and she took cough syrup last night for relief          History of Present Illness       This is a 54-year-old female who presents today with symptoms that started on .  She states she has had intermittent ear pain.  She has had low-grade temp and a dry cough.  No congestion she does have some slight postnasal drip.  She has been taking over-the-counter Zyrtec and her  had a prescription for cough medicine with codeine which helped last night.  She had a negative COVID test this morning.    Cough  This is a new problem. The current episode started in the past 7 days. The problem has been gradually worsening. The problem occurs every few minutes. The cough is Non-productive. Associated symptoms include chest pain, chills, ear pain, headaches, rhinorrhea and a sore  throat. The symptoms are aggravated by lying down.       Review of Systems   Review of Systems   Constitutional:  Positive for chills.   HENT:  Positive for ear pain, rhinorrhea and sore throat.    Respiratory:  Positive for cough.    Cardiovascular:  Positive for chest pain.   Neurological:  Positive for headaches.         Current Medications       Current Outpatient Medications:     benzonatate (TESSALON) 200 MG capsule, Take 1 capsule (200 mg total) by mouth 3 (three) times a day as needed for cough for up to 7 days, Disp: 20 capsule, Rfl: 0    methylPREDNISolone 4 MG tablet therapy pack, Use as directed on package, Disp: 21 tablet, Rfl: 0    Ca Carbonate-Mag Hydroxide (ROLAIDS PO), Take by mouth if needed, Disp: , Rfl:     Cholecalciferol (VITAMIN D-1000 MAX ST PO), Take 2 tablets by mouth in the morning, Disp: , Rfl:     estradiol (Vivelle-Dot) 0.0375 MG/24HR, Place 1 patch on the skin 2 (two) times a week, Disp: 24 patch, Rfl: 3    Loratadine (CLARITIN PO), Take by mouth, Disp: , Rfl:     LORazepam (ATIVAN) 0.5 mg tablet, Take 1 tablet (0.5 mg total) by mouth 2 (two) times a day as needed for anxiety, Disp: 30 tablet, Rfl: 0    Probiotic Product (Probiotic Daily) CAPS, Take 1 capsule by mouth in the morning, Disp: , Rfl:     Progesterone 100 MG CAPS, Take 1 capsule by mouth in the morning, Disp: 90 capsule, Rfl: 3    Current Allergies     Allergies as of 01/02/2025 - Reviewed 01/02/2025   Allergen Reaction Noted    Pollen extract  12/03/2013            The following portions of the patient's history were reviewed and updated as appropriate: allergies, current medications, past family history, past medical history, past social history, past surgical history and problem list.     Past Medical History:   Diagnosis Date    Abnormal Pap smear of cervix     Anemia     External hemorrhoids     Kidney stone     Lyme disease 11/2004    Migraine     Varicose veins of both lower extremities        Past Surgical History:  "  Procedure Laterality Date    COLONOSCOPY      DILATION AND CURETTAGE, DIAGNOSTIC / THERAPEUTIC      ENDOMETRIAL BIOPSY  10/21/2008    by suction    HEMORRHOID SURGERY      HYSTEROSCOPY W/ ENDOMETRIAL ABLATION  05/24/2010    POLYPECTOMY      enteroscopic    WISDOM TOOTH EXTRACTION         Family History   Problem Relation Age of Onset    Hyperlipidemia Mother     Heart disease Mother     Hypertension Mother     Paget's disease of bone Mother 75    Breast cancer Mother 73        paget's    Thyroid disease Mother     Arthritis Mother     Cancer Mother     Stroke Father     Hypertension Father     Prostate cancer Father 66    Thyroid disease Father     Melanoma Father     Arthritis Father     Cancer Father     Cancer Maternal Grandmother     Esophageal cancer Maternal Grandfather     Cancer Maternal Grandfather     Diabetes Paternal Grandmother     Mental illness Paternal Grandmother     Lung cancer Paternal Grandfather 75    Cancer Paternal Grandfather     No Known Problems Brother     Mental illness Brother     No Known Problems Brother     No Known Problems Son     No Known Problems Son     No Known Problems Maternal Aunt     Anemia Family     Arthritis Family     Depression Family     Anxiety disorder Family     Diabetes Family     Lung cancer Family     Migraines Family     Paget's disease of bone Family     Thyroid disease Family     Varicose Veins Family          Medications have been verified.        Objective   /78   Pulse 70   Temp 98.8 °F (37.1 °C) (Tympanic)   Resp 18   Ht 5' 9\" (1.753 m)   Wt 79.9 kg (176 lb 3.2 oz)   LMP  (LMP Unknown)   SpO2 97%   BMI 26.02 kg/m²   No LMP recorded (lmp unknown). Patient is postmenopausal.       Physical Exam     Physical Exam  Constitutional:       Appearance: Normal appearance.   HENT:      Head: Normocephalic and atraumatic.      Right Ear: Tympanic membrane, ear canal and external ear normal.      Left Ear: Tympanic membrane, ear canal and external ear " normal.      Nose: Congestion present.      Mouth/Throat:      Mouth: Mucous membranes are moist.      Pharynx: Oropharynx is clear. No posterior oropharyngeal erythema.   Eyes:      Conjunctiva/sclera: Conjunctivae normal.      Pupils: Pupils are equal, round, and reactive to light.   Cardiovascular:      Rate and Rhythm: Normal rate and regular rhythm.      Pulses: Normal pulses.      Heart sounds: Normal heart sounds.   Pulmonary:      Effort: Pulmonary effort is normal.      Breath sounds: Normal breath sounds. No wheezing, rhonchi or rales.   Chest:      Chest wall: No tenderness.   Abdominal:      General: Abdomen is flat. Bowel sounds are normal.   Musculoskeletal:         General: Normal range of motion.   Lymphadenopathy:      Cervical: No cervical adenopathy.   Skin:     General: Skin is warm and dry.      Capillary Refill: Capillary refill takes less than 2 seconds.   Neurological:      General: No focal deficit present.      Mental Status: She is alert and oriented to person, place, and time.   Psychiatric:         Mood and Affect: Mood normal.         Thought Content: Thought content normal.         Judgment: Judgment normal.

## 2025-03-05 NOTE — PATIENT INSTRUCTIONS

## 2025-03-11 ENCOUNTER — OFFICE VISIT (OUTPATIENT)
Dept: FAMILY MEDICINE CLINIC | Facility: HOSPITAL | Age: 55
End: 2025-03-11
Payer: COMMERCIAL

## 2025-03-11 VITALS
SYSTOLIC BLOOD PRESSURE: 119 MMHG | HEIGHT: 70 IN | DIASTOLIC BLOOD PRESSURE: 80 MMHG | OXYGEN SATURATION: 98 % | BODY MASS INDEX: 26.11 KG/M2 | WEIGHT: 182.4 LBS | HEART RATE: 79 BPM | TEMPERATURE: 98 F

## 2025-03-11 DIAGNOSIS — E78.00 ELEVATED LDL CHOLESTEROL LEVEL: ICD-10-CM

## 2025-03-11 DIAGNOSIS — Z00.00 ANNUAL PHYSICAL EXAM: Primary | ICD-10-CM

## 2025-03-11 DIAGNOSIS — R73.9 HYPERGLYCEMIA: ICD-10-CM

## 2025-03-11 DIAGNOSIS — E55.9 VITAMIN D DEFICIENCY: ICD-10-CM

## 2025-03-11 PROCEDURE — 99396 PREV VISIT EST AGE 40-64: CPT | Performed by: INTERNAL MEDICINE

## 2025-03-11 NOTE — PATIENT INSTRUCTIONS
"Patient Education     Routine physical for adults   The Basics   Written by the doctors and editors at [facility]   What is a physical? -- A physical is a routine visit, or \"check-up,\" with your doctor. You might also hear it called a \"wellness visit\" or \"preventive visit.\"  During each visit, the doctor will:   Ask about your physical and mental health   Ask about your habits, behaviors, and lifestyle   Do an exam   Give you vaccines if needed   Talk to you about any medicines you take   Give advice about your health   Answer your questions  Getting regular check-ups is an important part of taking care of your health. It can help your doctor find and treat any problems you have. But it's also important for preventing health problems.  A routine physical is different from a \"sick visit.\" A sick visit is when you see a doctor because of a health concern or problem. Since physicals are scheduled ahead of time, you can think about what you want to ask the doctor.  How often should I get a physical? -- It depends on your age and health. In general, for people age 21 years and older:   If you are younger than 50 years, you might be able to get a physical every 3 years.   If you are 50 years or older, your doctor might recommend a physical every year.  If you have an ongoing health condition, like diabetes or high blood pressure, your doctor will probably want to see you more often.  What happens during a physical? -- In general, each visit will include:   Physical exam - The doctor or nurse will check your height, weight, heart rate, and blood pressure. They will also look at your eyes and ears. They will ask about how you are feeling and whether you have any symptoms that bother you.   Medicines - It's a good idea to bring a list of all the medicines you take to each doctor visit. Your doctor will talk to you about your medicines and answer any questions. Tell them if you are having any side effects that bother you. You " "should also tell them if you are having trouble paying for any of your medicines.   Habits and behaviors - This includes:   Your diet   Your exercise habits   Whether you smoke, drink alcohol, or use drugs   Whether you are sexually active   Whether you feel safe at home  Your doctor will talk to you about things you can do to improve your health and lower your risk of health problems. They will also offer help and support. For example, if you want to quit smoking, they can give you advice and might prescribe medicines. If you want to improve your diet or get more physical activity, they can help you with this, too.   Lab tests, if needed - The tests you get will depend on your age and situation. For example, your doctor might want to check your:   Cholesterol   Blood sugar   Iron level   Vaccines - The recommended vaccines will depend on your age, health, and what vaccines you already had. Vaccines are very important because they can prevent certain serious or deadly infections.   Discussion of screening - \"Screening\" means checking for diseases or other health problems before they cause symptoms. Your doctor can recommend screening based on your age, risk, and preferences. This might include tests to check for:   Cancer, such as breast, prostate, cervical, ovarian, colorectal, prostate, lung, or skin cancer   Sexually transmitted infections, such as chlamydia and gonorrhea   Mental health conditions like depression and anxiety  Your doctor will talk to you about the different types of screening tests. They can help you decide which screenings to have. They can also explain what the results might mean.   Answering questions - The physical is a good time to ask the doctor or nurse questions about your health. If needed, they can refer you to other doctors or specialists, too.  Adults older than 65 years often need other care, too. As you get older, your doctor will talk to you about:   How to prevent falling at " home   Hearing or vision tests   Memory testing   How to take your medicines safely   Making sure that you have the help and support you need at home  All topics are updated as new evidence becomes available and our peer review process is complete.  This topic retrieved from [employer] on: May 02, 2024.  Topic 672483 Version 1.0  Release: 32.4.3 - C32.122  © 2024 UpToDate, Inc. and/or its affiliates. All rights reserved.  Consumer Information Use and Disclaimer   Disclaimer: This generalized information is a limited summary of diagnosis, treatment, and/or medication information. It is not meant to be comprehensive and should be used as a tool to help the user understand and/or assess potential diagnostic and treatment options. It does NOT include all information about conditions, treatments, medications, side effects, or risks that may apply to a specific patient. It is not intended to be medical advice or a substitute for the medical advice, diagnosis, or treatment of a health care provider based on the health care provider's examination and assessment of a patient's specific and unique circumstances. Patients must speak with a health care provider for complete information about their health, medical questions, and treatment options, including any risks or benefits regarding use of medications. This information does not endorse any treatments or medications as safe, effective, or approved for treating a specific patient. Up[employer]Date, Inc. and its affiliates disclaim any warranty or liability relating to this information or the use thereof.The use of this information is governed by the Terms of Use, available at https://www.woltersTruecalleruwer.com/en/know/clinical-effectiveness-terms. 2024© UpToDate, Inc. and its affiliates and/or licensors. All rights reserved.  Copyright   © 2024 Up[employer]Date, Inc. and/or its affiliates. All rights reserved.    Patient Education     Diet and health   The Basics   Written by the doctors and  "editors at [employer]   Why is it important to eat a healthy diet? -- It's important to eat a healthy diet because eating the right foods can keep you healthy now and later in life. It can lower the risk of problems like heart disease, diabetes, high blood pressure, and some types of cancer. It can also help you live longer and improve your quality of life.  What kind of diet is best? -- There is no 1 specific diet that experts recommend for everyone. People choose what foods to eat for many different reasons. These include personal preference, culture, Methodist, allergies or intolerances, and nutritional goals. People also need to consider the cost and availability of different foods.  In general, experts recommend a diet that:   Includes lots of vegetables, fruits, beans, nuts, and whole grains   Limits red and processed meats, unhealthy fats, sugar, salt, and alcohol  What are dietary patterns? -- A dietary \"pattern\" means generally eating certain types of foods while limiting others. Some people need to follow a specific dietary pattern because of their health needs. For example, if you have high blood pressure, your doctor might recommend a diet low in salt.  If you are trying to improve your health in general, choosing a healthy dietary pattern can help. This does not have to mean being very strict about what you eat or avoid. The goal is to think about getting plenty of healthy foods while limiting less healthy foods.  Examples of dietary patterns include:   Mediterranean diet - This involves eating a lot of fruits, vegetables, nuts, and whole grains, and uses olive oil instead of other fats. It also includes some fish, poultry, and dairy products, but not a lot of red meat. Following this diet can help your overall health, and might even lower your risk of having a stroke.   Plant-based diets - These patterns focus on vegetables, fruits, grains, beans, and nuts. They limit or avoid food that comes from " "animals, such as meat and dairy. There are different types of plant-based diets, including vegetarian and vegan.   Low-fat diet - A low-fat diet involves limiting calories from fat. This might help some people keep weight off if that is their goal, but it does not have many other health benefits. If you choose to follow a low-fat diet, it is also important to focus on getting lots of whole grains, legumes, fruits, and vegetables. Limit refined grains and sugar.   Low-cholesterol diet - Cholesterol is found in foods with a lot of saturated fat, like red meat, butter, and cheese. A low-cholesterol diet focuses on limiting the amount of cholesterol that you eat. Limiting the cholesterol in your diet can also help lower the amount of unhealthy fats that you eat.  Which foods are especially healthy? -- Foods that are especially healthy include:   Fruits and vegetables - Eating a diet with lots of fruits and vegetables can help prevent heart disease and stroke. It might also help prevent certain types of cancer. Try to eat fruits and vegetables at each meal and also for snacks. If you don't have fresh fruits and vegetables available, you can eat frozen or canned ones instead. Doctors recommend eating at least 5 servings of fruits or vegetables each day.   Whole grains - Whole-grain foods include 100 percent whole-wheat bread, steel cut oats, and whole-grain pasta. These are healthier than foods made with \"refined\" grains, like white bread and white rice. Eating lots of whole grains instead of refined grains has been shown to help with weight control. It can also lower the risk of several health problems, including colon cancer, heart disease, and diabetes. Doctors recommend that most people try to eat 5 to 8 servings of whole-grain, high-fiber foods each day.   Foods with fiber - Eating foods with a lot of fiber can help prevent heart disease and stroke. If you have type 2 diabetes, it can also help control your blood " "sugar. Foods that have a lot of fiber include vegetables, fruits, beans, nuts, oatmeal, and whole-grain breads and cereals. You can tell how much fiber is in a food by reading the nutrition label (figure 1). Doctors recommend that most people eat about 25 to 34 grams of fiber each day.   Foods with calcium and vitamin D - Babies, children, and adults need calcium and vitamin D to help keep their bones strong. Adults also need calcium and vitamin D to help prevent osteoporosis. Osteoporosis is a condition that causes bones to get \"thin\" and break more easily than usual. Different foods and drinks have calcium and vitamin D in them (figure 2). People who don't get enough calcium and vitamin D in their diet might need to take a supplement. Doctors recommend that most people have 2 to 3 servings of foods with calcium and vitamin D each day.   Foods with protein - Protein helps your muscles and bones stay strong. Healthy foods with a lot of protein include chicken, fish, eggs, beans, nuts, and soy products. Red meat also has a lot of protein, but it also contains fats, which can be unhealthy. Doctors recommend that most people try to eat about 5 servings of protein each day.   Healthy fats - There are different types of fats. Some types of fats are better for your body than others. Healthy fats are \"monounsaturated\" or \"polyunsaturated\" fats. These are found in fatty fish, nuts and nut butters, and avocados. Use plant-based oils when cooking. Examples of these oils include olive, canola, safflower, sunflower, and corn oil. Eating foods with healthy fats, while avoiding or limiting foods with unhealthy fats, might lower the risk of heart disease.   Foods with folate - Folate is a vitamin that is important for pregnant people, since it helps prevent certain birth defects. It is also called \"folic acid.\" Anyone who could get pregnant should get at least 400 micrograms of folic acid daily, whether or not they are actively " "trying to get pregnant. Folate is found in many breakfast cereals, oranges, orange juice, and green leafy vegetables.  What foods should I avoid or limit? -- To eat a healthy diet, there are some things that you should avoid or limit. They include:   Unhealthy fats - \"Trans\" fats are especially unhealthy. They are found in margarines, many fast foods, and some store-bought baked goods. \"Saturated\" fats are found in animal products like meats, egg yolks, butter, cheese, and full-fat milk products. Unhealthy fats can raise your cholesterol level and increase your chance of getting heart disease.   Sugar - To have a healthy diet, it's important to limit or avoid added sugar, sweets, and refined grains. Refined grains are found in white bread, white rice, most pastas, and most packaged \"snack\" foods.  Avoiding sugar-sweetened beverages, like soda and sports drinks, can also help improve your health.  Avoid canned fruits in \"heavy\" syrup.   Red and processed meats - Studies have shown that eating a lot of red meat can increase your risk of certain health problems, including heart disease and cancer. You should limit the amount of red meat that you eat. This is also true for processed meats like sausage, hot dogs, and tariq.  Can I drink alcohol as part of a healthy diet? -- Not drinking alcohol at all is the healthiest choice. Regular drinking can raise a person's chances of getting liver disease and certain types of cancers. In females, even 1 drink a day can increase the risk of getting breast cancer.  If you do choose to drink, most doctors recommend limiting alcohol to no more than:   1 drink a day for females   2 drinks a day for males  The limits are different because, generally, the female body takes longer to break down alcohol.  How many calories do I need each day? -- Calories give your body energy. The number of calories that you need each day depends on your weight, height, age, sex, and how active you " "are.  Your doctor or nurse can tell you about how many calories you should eat each day. You can also work with a dietitian (nutrition expert) to learn more about your dietary needs and options.  What if I am having trouble improving my diet? -- It can be hard to change the way that you eat. Remember that even small changes can improve your health.  Here are some tips that might help:   Try to make fruits and vegetables part of every meal. If you don't have fresh fruits and vegetables, frozen or canned are good options. Look for products without added salt or sugar.   Keep a bowl of fruit out for snacking.   When you can, choose whole grains instead of refined grains. Choose chicken, fish, and beans instead of red meat and cheese.   Try to eat prepared and processed foods less often.   Try flavored seltzer or water instead of soda or juice.   When eating at fast food restaurants, look for healthier items, like broiled chicken or salad.  If you have questions about which foods you should or should not eat, ask your doctor, nurse, or dietitian. The right diet for you will depend, in part, on your health and any medical conditions you have.  All topics are updated as new evidence becomes available and our peer review process is complete.  This topic retrieved from [employer] on: Feb 28, 2024.  Topic 65092 Version 28.0  Release: 32.2.4 - C32.58  © 2024 [employer]Date, Inc. and/or its affiliates. All rights reserved.  figure 1: Nutrition label - Fiber     This is an example of a nutrition label. To figure out how much fiber is in a food, look for the line that says \"Dietary Fiber.\" It's also important to look at the serving size. This food has 7 grams of fiber in each serving, and each serving is 1 cup.  Graphic 25006 Version 8.0  figure 2: Foods and drinks with calcium and vitamin D     Foods rich in calcium include ice cream, soy milk, breads, kale, broccoli, milk, cheese, cottage cheese, almonds, yogurt, ready-to-eat cereals, " "beans, and tofu. Foods rich in vitamin D include milk, fortified plant-based \"milks\" (soy, almond), canned tuna fish, cod liver oil, yogurt, ready-to-eat-cereals, cooked salmon, canned sardines, mackerel, and eggs. Some of these foods are rich in both.  Graphic 15905 Version 4.0  Consumer Information Use and Disclaimer   Disclaimer: This generalized information is a limited summary of diagnosis, treatment, and/or medication information. It is not meant to be comprehensive and should be used as a tool to help the user understand and/or assess potential diagnostic and treatment options. It does NOT include all information about conditions, treatments, medications, side effects, or risks that may apply to a specific patient. It is not intended to be medical advice or a substitute for the medical advice, diagnosis, or treatment of a health care provider based on the health care provider's examination and assessment of a patient's specific and unique circumstances. Patients must speak with a health care provider for complete information about their health, medical questions, and treatment options, including any risks or benefits regarding use of medications. This information does not endorse any treatments or medications as safe, effective, or approved for treating a specific patient. [employer]Date, Inc. and its affiliates disclaim any warranty or liability relating to this information or the use thereof.The use of this information is governed by the Terms of Use, available at https://www.woltersALDEA Pharmaceuticalsuwer.com/en/know/clinical-effectiveness-terms. 2024© UpToDate, Inc. and its affiliates and/or licensors. All rights reserved.  Copyright   © 2024 UpToDate, Inc. and/or its affiliates. All rights reserved.    Patient Education     Exercise and movement   The Basics   Written by the doctors and editors at [employer]   What are the benefits of movement? -- Moving your body has many benefits. It can:   Burn calories, which helps people " manage their weight   Help control blood sugar levels in people with diabetes   Lower blood pressure, especially in people with high blood pressure   Lower stress, and help with depression and anxiety   Keep bones strong, so they don't get thin and break easily   Lower the chance of dying from heart disease  Adding even small amounts of physical activity to your daily routine can improve your health.  What are the main types of exercise? -- There are 3 main types of exercise:   Aerobic exercise - This raises your heart rate. Examples include walking, running, dancing, riding a bike, and swimming.   Muscle strengthening - This helps make your muscles stronger. You can do it using weights, exercise bands, or weight machines. You can also use your own body weight, as with push-ups, or by lifting items in your home, like jugs of water.   Stretching - These help your muscles and joints move more easily.  It's important to have all 3 types in your exercise program. That way, your body, muscles, and joints can be as healthy as possible.  Should I talk to my doctor or nurse before I start exercising? -- If you have not exercised before or have not exercised in a long time, talk with your doctor or nurse before you start a very active exercise program.  If you have heart disease or risk factors for heart disease (like high blood pressure or diabetes), your doctor or nurse might recommend that you have an exercise test before starting an exercise program.  When you begin an exercise program, start slowly. For example, do the exercise at a slow pace or for a few minutes only. Over time, you can exercise faster and for longer periods of time.  What should I do when I exercise? -- Each time you exercise, you should:   Warm up - This can help keep you from hurting your muscles when you exercise. To warm up, do a light aerobic exercise (such as walking slowly) or stretch for 5 to 10 minutes.   Work out - Try to get a mix of  aerobic exercise, muscle strengthening, and stretching. During an aerobic workout, you can walk fast, swim, run, or use an exercise machine, for example. Other activities, like dancing or playing tennis, are also forms of aerobic exercise. You should also take time to stretch all of your joints, including your neck, shoulders, back, hips, and knees. At least 2 times a week, you can do muscle strengthening exercises as part of your workout.   Cool down - This helps keep you from feeling dizzy after you exercise and helps prevent muscle cramps. To cool down, you can stretch or do a light aerobic exercise for 5 minutes.  Some people go to a gym or do group exercise classes. But you can exercise even without these things. Some exercises can be done even in a small space. You can also try online videos or smartphone apps to get ideas for different types of exercise.  How often should I exercise? -- Doctors recommend that people exercise at least 30 minutes a day, on 5 or more days of the week.  If you can't exercise for 30 minutes straight, try to exercise for 10 minutes at a time, 3 or 4 times a day. Even exercising for shorter amounts of time is good for you, especially if it means spending less time sitting.  When should I call my doctor or nurse? -- If you have any of the following symptoms when you exercise, stop exercising and call your doctor or nurse right away:   Pain or pressure in your chest, arms, throat, jaw, or back   Nausea or vomiting   Feeling like your heart is fluttering or racing very fast   Feeling dizzy or faint  What if I don't have time to exercise? -- Many people have very busy lives and might not think that they have time to exercise. But it's important to try to find time, even if you are tired or work a lot. Exercise can increase your energy level, which can make you feel better and might even help you get more work done.  Even if it's hard to set aside a lot of time to exercise, you can still  improve your health by moving your body more. There are many ways that you can be more active. For example, you can:   Take the stairs instead of the elevator.   Park in a parking space that is farther away from the door.   Take a longer route when you walk from one place to another.  Spending a lot of time sitting still (for example, watching TV or working on the computer) is bad for your health. Try to get up and move around whenever you can. Even small amounts of movement, like taking short walks, doing household chores, or gardening, can improve your health. Finding activities that you enjoy, or doing them with other people, can help you add more movement into your daily life.  What else should I do when I exercise? -- To exercise safely and avoid problems, it's important to:   Drink fluids during and after exercising (but avoid drinks with a lot of caffeine or sugar).   Avoid exercising outside if it is too hot or cold.   Wear layers of clothes, so that you can take them off if you get too hot.   Wear shoes that fit well and support your feet.   Be aware of your surroundings if you exercise outside.  All topics are updated as new evidence becomes available and our peer review process is complete.  This topic retrieved from [employer] on: May 18, 2024.  Topic 50375 Version 31.0  Release: 32.4.3 - C32.137  © 2024 UpToDate, Inc. and/or its affiliates. All rights reserved.  Consumer Information Use and Disclaimer   Disclaimer: This generalized information is a limited summary of diagnosis, treatment, and/or medication information. It is not meant to be comprehensive and should be used as a tool to help the user understand and/or assess potential diagnostic and treatment options. It does NOT include all information about conditions, treatments, medications, side effects, or risks that may apply to a specific patient. It is not intended to be medical advice or a substitute for the medical advice, diagnosis, or  "treatment of a health care provider based on the health care provider's examination and assessment of a patient's specific and unique circumstances. Patients must speak with a health care provider for complete information about their health, medical questions, and treatment options, including any risks or benefits regarding use of medications. This information does not endorse any treatments or medications as safe, effective, or approved for treating a specific patient. [employer] and its affiliates disclaim any warranty or liability relating to this information or the use thereof.The use of this information is governed by the Terms of Use, available at https://www.[employer].com/en/know/clinical-effectiveness-terms. 2024© [employer]ToDate, Inc. and its affiliates and/or licensors. All rights reserved.  Copyright   © 2024 [employer], Inc. and/or its affiliates. All rights reserved.    Patient Education     Routine physical for adults   The Basics   Written by the doctors and editors at [employer]   What is a physical? -- A physical is a routine visit, or \"check-up,\" with your doctor. You might also hear it called a \"wellness visit\" or \"preventive visit.\"  During each visit, the doctor will:   Ask about your physical and mental health   Ask about your habits, behaviors, and lifestyle   Do an exam   Give you vaccines if needed   Talk to you about any medicines you take   Give advice about your health   Answer your questions  Getting regular check-ups is an important part of taking care of your health. It can help your doctor find and treat any problems you have. But it's also important for preventing health problems.  A routine physical is different from a \"sick visit.\" A sick visit is when you see a doctor because of a health concern or problem. Since physicals are scheduled ahead of time, you can think about what you want to ask the doctor.  How often should I get a physical? -- It depends on your age and health. In " "general, for people age 21 years and older:   If you are younger than 50 years, you might be able to get a physical every 3 years.   If you are 50 years or older, your doctor might recommend a physical every year.  If you have an ongoing health condition, like diabetes or high blood pressure, your doctor will probably want to see you more often.  What happens during a physical? -- In general, each visit will include:   Physical exam - The doctor or nurse will check your height, weight, heart rate, and blood pressure. They will also look at your eyes and ears. They will ask about how you are feeling and whether you have any symptoms that bother you.   Medicines - It's a good idea to bring a list of all the medicines you take to each doctor visit. Your doctor will talk to you about your medicines and answer any questions. Tell them if you are having any side effects that bother you. You should also tell them if you are having trouble paying for any of your medicines.   Habits and behaviors - This includes:   Your diet   Your exercise habits   Whether you smoke, drink alcohol, or use drugs   Whether you are sexually active   Whether you feel safe at home  Your doctor will talk to you about things you can do to improve your health and lower your risk of health problems. They will also offer help and support. For example, if you want to quit smoking, they can give you advice and might prescribe medicines. If you want to improve your diet or get more physical activity, they can help you with this, too.   Lab tests, if needed - The tests you get will depend on your age and situation. For example, your doctor might want to check your:   Cholesterol   Blood sugar   Iron level   Vaccines - The recommended vaccines will depend on your age, health, and what vaccines you already had. Vaccines are very important because they can prevent certain serious or deadly infections.   Discussion of screening - \"Screening\" means checking " for diseases or other health problems before they cause symptoms. Your doctor can recommend screening based on your age, risk, and preferences. This might include tests to check for:   Cancer, such as breast, prostate, cervical, ovarian, colorectal, prostate, lung, or skin cancer   Sexually transmitted infections, such as chlamydia and gonorrhea   Mental health conditions like depression and anxiety  Your doctor will talk to you about the different types of screening tests. They can help you decide which screenings to have. They can also explain what the results might mean.   Answering questions - The physical is a good time to ask the doctor or nurse questions about your health. If needed, they can refer you to other doctors or specialists, too.  Adults older than 65 years often need other care, too. As you get older, your doctor will talk to you about:   How to prevent falling at home   Hearing or vision tests   Memory testing   How to take your medicines safely   Making sure that you have the help and support you need at home  All topics are updated as new evidence becomes available and our peer review process is complete.  This topic retrieved from [employer] on: May 02, 2024.  Topic 520764 Version 1.0  Release: 32.4.3 - C32.122  © 2024 UpToDate, Inc. and/or its affiliates. All rights reserved.  Consumer Information Use and Disclaimer   Disclaimer: This generalized information is a limited summary of diagnosis, treatment, and/or medication information. It is not meant to be comprehensive and should be used as a tool to help the user understand and/or assess potential diagnostic and treatment options. It does NOT include all information about conditions, treatments, medications, side effects, or risks that may apply to a specific patient. It is not intended to be medical advice or a substitute for the medical advice, diagnosis, or treatment of a health care provider based on the health care provider's examination  and assessment of a patient's specific and unique circumstances. Patients must speak with a health care provider for complete information about their health, medical questions, and treatment options, including any risks or benefits regarding use of medications. This information does not endorse any treatments or medications as safe, effective, or approved for treating a specific patient. Up[employer]Date, Inc. and its affiliates disclaim any warranty or liability relating to this information or the use thereof.The use of this information is governed by the Terms of Use, available at https://www.[employer]tersPriviauwer.com/en/know/clinical-effectiveness-terms. 2024© Up[employer]Date, Inc. and its affiliates and/or licensors. All rights reserved.  Copyright   © 2024 [employer]Date, Inc. and/or its affiliates. All rights reserved.

## 2025-03-11 NOTE — PROGRESS NOTES
Adult Annual Physical  Name: Lala Neely      : 1970      MRN: [id number]  Encounter Provider: Deneen Hurley DO  Encounter Date: 3/11/2025   Encounter department: [facility] [address]     Assessment & Plan  Annual physical exam         BMI 26.0-26.9,adult  Encouraged to con't with regular exercise/kita strength training and to watch the diet/snacking a bit more, pt deferring nutrition eval but will call if she changes her mind  Orders:    CBC and differential    Comprehensive metabolic panel    Lipid panel    TSH, 3rd generation with Free T4 reflex    Hemoglobin A1C With EAG; Future    Vitamin D 25 hydroxy; Future    Elevated LDL cholesterol level  Last LDL had improved but will recheck levels - BW order given, diet/exercise again reviewed, will follow  Orders:    CBC and differential    Comprehensive metabolic panel    Lipid panel    TSH, 3rd generation with Free T4 reflex    Hemoglobin A1C With EAG; Future    Vitamin D 25 hydroxy; Future    Hyperglycemia  Last FBS was wnl but will check BW - order given, diet/exercise again reviewed, will follow  Orders:    CBC and differential    Comprehensive metabolic panel    Lipid panel    TSH, 3rd generation with Free T4 reflex    Hemoglobin A1C With EAG; Future    Vitamin D 25 hydroxy; Future    Vitamin D deficiency  Check levels with labs - BW order given, con't current supplement for now, will follow  Orders:    CBC and differential    Comprehensive metabolic panel    Lipid panel    TSH, 3rd generation with Free T4 reflex    Hemoglobin A1C With EAG; Future    Vitamin D 25 hydroxy; Future    Preventive Screenings:  - Diabetes Screening: screening up-to-date  - Cholesterol Screening: screening up-to-date   - Hepatitis C screening: screening up-to-date   - Cervical cancer screening: screening up-to-date   - Breast cancer screening: screening up-to-date   - Colon cancer screening: screening up-to-date   - Lung cancer screening:  screening not indicated     Counseling/Anticipatory Guidance:    - Dental health: discussed importance of regular tooth brushing, flossing, and dental visits.   - Diet: discussed recommendations for a healthy/well-balanced diet.   - Exercise: the importance of regular exercise/physical activity was discussed. Recommend exercise 3-5 times per week for at least 30 minutes.       Depression Screening and Follow-up Plan: Patient was screened for depression during today's encounter. They screened negative with a PHQ-2 score of 0.      Colonoscopy 4/23 - 5 yrs    Mammo 11/24    PAP 6/23 - Dr. Elisabeth FULTON 3/24    History of Present Illness     Adult Annual Physical:  Patient presents for annual physical.     Diet and Physical Activity:  - Diet/Nutrition: limited junk food, low carb diet and portion control. wgt up 7 lbs from March 24, she admits diet is up and down and she admits she eats with boredom in the evening  - Exercise: walking, 3-4 times a week on average and moderate cardiovascular exercise. walking and goes to gym and does elliptical a few days a week, started lifting weights    Depression Screening:  - PHQ-2 Score: 0    General Health:  - Sleep: sleeps well.  - Hearing: normal hearing right ear.  - Vision: no vision problems, wears glasses and most recent eye exam < 1 year ago.  - Dental: regular dental visits and brushes teeth twice daily.    /GYN Health:  - Follows with GYN: yes.   - Menopause: postmenopausal.   - History of STDs: no  - Contraception: menopause.      Advanced Care Planning:  - Has an advanced directive?: yes    - Has a durable medical POA?: yes    - ACP document given to patient?: yes      Review of Systems   Constitutional:  Positive for unexpected weight change. Negative for chills, fatigue and fever.   HENT:  Negative for congestion, hearing loss and trouble swallowing.    Eyes:  Negative for pain and visual disturbance.   Respiratory:  Negative for cough, shortness of breath and  "wheezing.    Cardiovascular:  Negative for chest pain, palpitations and leg swelling.   Gastrointestinal:  Negative for abdominal pain, blood in stool, constipation, diarrhea and nausea.   Genitourinary:  Negative for difficulty urinating, dysuria, vaginal bleeding and vaginal pain.   Musculoskeletal:  Positive for arthralgias. Negative for gait problem, joint swelling and neck pain.   Skin:  Negative for rash and wound.   Neurological:  Negative for dizziness, light-headedness and headaches.   Hematological:  Negative for adenopathy. Does not bruise/bleed easily.   Psychiatric/Behavioral:  Negative for confusion, dysphoric mood and sleep disturbance.          Objective   /80 (BP Location: Left arm, Patient Position: Sitting, Cuff Size: Large)   Pulse 79   Temp 98 °F (36.7 °C) (Tympanic)   Ht 5' 9.5\" (1.765 m)   Wt 82.7 kg (182 lb 6.4 oz)   LMP  (LMP Unknown)   SpO2 98%   BMI 26.55 kg/m²     Physical Exam  Vitals and nursing note reviewed.   Constitutional:       General: She is not in acute distress.     Appearance: She is well-developed. She is not ill-appearing.   HENT:      Head: Normocephalic and atraumatic.      Right Ear: Tympanic membrane and external ear normal. There is no impacted cerumen.      Left Ear: Tympanic membrane and external ear normal. There is no impacted cerumen.      Mouth/Throat:      Mouth: Mucous membranes are moist.      Pharynx: Oropharynx is clear. No oropharyngeal exudate.   Eyes:      General:         Right eye: No discharge.         Left eye: No discharge.      Conjunctiva/sclera: Conjunctivae normal.   Neck:      Thyroid: No thyromegaly.      Trachea: No tracheal deviation.   Cardiovascular:      Rate and Rhythm: Normal rate and regular rhythm.      Heart sounds: Normal heart sounds. No murmur heard.  Pulmonary:      Effort: Pulmonary effort is normal. No respiratory distress.      Breath sounds: Normal breath sounds. No wheezing, rhonchi or rales.   Abdominal:      " General: There is no distension.      Palpations: Abdomen is soft.      Tenderness: There is no abdominal tenderness. There is no guarding or rebound.   Musculoskeletal:         General: No deformity or signs of injury.      Cervical back: Neck supple.   Lymphadenopathy:      Cervical: No cervical adenopathy.   Skin:     General: Skin is warm and dry.      Coloration: Skin is not pale.      Findings: No bruising or rash.   Neurological:      General: No focal deficit present.      Mental Status: She is alert. Mental status is at baseline.      Motor: No abnormal muscle tone.      Gait: Gait normal.   Psychiatric:         Mood and Affect: Mood normal.         Behavior: Behavior normal.         Thought Content: Thought content normal.         Judgment: Judgment normal.

## 2025-03-11 NOTE — ASSESSMENT & PLAN NOTE
Check levels with labs - BW order given, con't current supplement for now, will follow  Orders:    CBC and differential    Comprehensive metabolic panel    Lipid panel    TSH, 3rd generation with Free T4 reflex    Hemoglobin A1C With EAG; Future    Vitamin D 25 hydroxy; Future

## 2025-05-09 ENCOUNTER — TELEPHONE (OUTPATIENT)
Age: 55
End: 2025-05-09

## 2025-05-09 NOTE — TELEPHONE ENCOUNTER
LMOM for patient to call the office to reschedule her appt on June 26th with Dr. Barber. Provider will not be in the office

## 2025-05-15 ENCOUNTER — OFFICE VISIT (OUTPATIENT)
Dept: URGENT CARE | Facility: CLINIC | Age: 55
End: 2025-05-15
Payer: COMMERCIAL

## 2025-05-15 VITALS
SYSTOLIC BLOOD PRESSURE: 150 MMHG | RESPIRATION RATE: 16 BRPM | DIASTOLIC BLOOD PRESSURE: 84 MMHG | OXYGEN SATURATION: 97 % | TEMPERATURE: 99 F | HEART RATE: 76 BPM

## 2025-05-15 DIAGNOSIS — J06.9 UPPER RESPIRATORY TRACT INFECTION, UNSPECIFIED TYPE: Primary | ICD-10-CM

## 2025-05-15 PROCEDURE — S9083 URGENT CARE CENTER GLOBAL: HCPCS | Performed by: NURSE PRACTITIONER

## 2025-05-15 PROCEDURE — G0382 LEV 3 HOSP TYPE B ED VISIT: HCPCS | Performed by: NURSE PRACTITIONER

## 2025-05-15 RX ORDER — DEXTROMETHORPHAN HYDROBROMIDE AND PROMETHAZINE HYDROCHLORIDE 15; 6.25 MG/5ML; MG/5ML
5 SYRUP ORAL 3 TIMES DAILY
Qty: 120 ML | Refills: 0 | Status: SHIPPED | OUTPATIENT
Start: 2025-05-15

## 2025-05-15 RX ORDER — BROMPHENIRAMINE MALEATE, PSEUDOEPHEDRINE HYDROCHLORIDE, AND DEXTROMETHORPHAN HYDROBROMIDE 2; 30; 10 MG/5ML; MG/5ML; MG/5ML
10 SYRUP ORAL 3 TIMES DAILY PRN
Qty: 150 ML | Refills: 0 | Status: SHIPPED | OUTPATIENT
Start: 2025-05-15

## 2025-05-15 NOTE — PROGRESS NOTES
[facility]        NAME: Lala Neely is a 54 y.o. female  : 1970    MRN: [id number]  DATE: May 15, 2025  TIME: 12:11 PM    Assessment and Plan   Upper respiratory tract infection, unspecified type [J06.9]  1. Upper respiratory tract infection, unspecified type  promethazine-dextromethorphan (PHENERGAN-DM) 6.25-15 mg/5 mL oral syrup    brompheniramine-pseudoephedrine-DM 30-2-10 MG/5ML syrup            Patient Instructions       Likely viral upper respiratory symptoms  Take medications as prescribed  Phenergan DM may cause drowsiness and some people  Follow up with PCP in 3-5 days.  Proceed to  ER if symptoms worsen.    If tests have been performed at [facility], our office will contact you with results if changes need to be made to the care plan discussed with you at the visit.  [facility]    Chief Complaint     Chief Complaint   Patient presents with    Cough     Pt has had a dry cough and sore throat that started on Monday and has worsened. Has used leftover Tessalon perles without any relief. Denies any nasal congestion          History of Present Illness       Cough  This is a new problem. The current episode started in the past 7 days. The problem has been rapidly worsening. The problem occurs every few minutes. The cough is Non-productive. Associated symptoms include chills, headaches, postnasal drip, a sore throat and sweats. Pertinent negatives include no chest pain, ear pain, fever or wheezing. The symptoms are aggravated by lying down.     States she cannot sleep well because she is coughing so much, especially when laying down. Did not check temp at home       Review of Systems   Review of Systems   Constitutional:  Positive for chills. Negative for fever.   HENT:  Positive for postnasal drip and sore throat. Negative for congestion and ear pain.    Respiratory:  Positive for cough. Negative for chest tightness and wheezing.    Cardiovascular:   Negative for chest pain.   Gastrointestinal:  Negative for diarrhea and vomiting.   Neurological:  Positive for headaches.         Current Medications     Current Medications[1]    Current Allergies     Allergies as of 05/15/2025 - Reviewed 05/15/2025   Allergen Reaction Noted    Pollen extract  12/03/2013            The following portions of the patient's history were reviewed and updated as appropriate: allergies, current medications, past family history, past medical history, past social history, past surgical history and problem list.     Past Medical History:   Diagnosis Date    Abnormal Pap smear of cervix     Anemia     External hemorrhoids     Lyme disease 11/2004    Migraine     Varicose veins of both lower extremities        Past Surgical History:   Procedure Laterality Date    COLONOSCOPY      DILATION AND CURETTAGE, DIAGNOSTIC / THERAPEUTIC      ENDOMETRIAL BIOPSY  10/21/2008    by suction    HEMORRHOID SURGERY      HYSTEROSCOPY W/ ENDOMETRIAL ABLATION  05/24/2010    POLYPECTOMY      enteroscopic    WISDOM TOOTH EXTRACTION         Family History   Problem Relation Age of Onset    Hyperlipidemia Mother     Heart disease Mother     Hypertension Mother     Paget's disease of bone Mother 75    Breast cancer Mother 73        paget's    Thyroid disease Mother     Arthritis Mother     Cancer Mother     Stroke Father     Hypertension Father     Prostate cancer Father 66    Thyroid disease Father     Melanoma Father     Arthritis Father     Cancer Father     Cancer Maternal Grandmother     Esophageal cancer Maternal Grandfather     Cancer Maternal Grandfather     Diabetes Paternal Grandmother     Mental illness Paternal Grandmother     Lung cancer Paternal Grandfather 75    Cancer Paternal Grandfather     No Known Problems Brother     Mental illness Brother     No Known Problems Brother     No Known Problems Son     No Known Problems Son     No Known Problems Maternal Aunt     Anemia Family     Arthritis Family      Depression Family     Anxiety disorder Family     Diabetes Family     Lung cancer Family     Migraines Family     Paget's disease of bone Family     Thyroid disease Family     Varicose Veins Family          Medications have been verified.        Objective   /84   Pulse 76   Temp 99 °F (37.2 °C)   Resp 16   LMP  (LMP Unknown)   SpO2 97%   No LMP recorded (lmp unknown). Patient is postmenopausal.       Physical Exam     Physical Exam  Constitutional:       General: She is not in acute distress.  HENT:      Right Ear: Tympanic membrane normal.      Left Ear: Tympanic membrane normal.      Nose: Rhinorrhea present.      Mouth/Throat:      Pharynx: No posterior oropharyngeal erythema.      Comments: Mild postnasal drip    Cardiovascular:      Rate and Rhythm: Regular rhythm.      Heart sounds: Normal heart sounds.   Pulmonary:      Effort: Pulmonary effort is normal.      Breath sounds: Normal breath sounds. No wheezing.                        [1]   Current Outpatient Medications:     brompheniramine-pseudoephedrine-DM 30-2-10 MG/5ML syrup, Take 10 mL by mouth 3 (three) times a day as needed for congestion or cough, Disp: 150 mL, Rfl: 0    Ca Carbonate-Mag Hydroxide (ROLAIDS PO), Take by mouth if needed, Disp: , Rfl:     Cholecalciferol (VITAMIN D-1000 MAX ST PO), Take 2 tablets by mouth in the morning, Disp: , Rfl:     estradiol (Vivelle-Dot) 0.0375 MG/24HR, Place 1 patch on the skin 2 (two) times a week, Disp: 24 patch, Rfl: 3    Loratadine (CLARITIN PO), Take by mouth, Disp: , Rfl:     LORazepam (ATIVAN) 0.5 mg tablet, Take 1 tablet (0.5 mg total) by mouth 2 (two) times a day as needed for anxiety, Disp: 30 tablet, Rfl: 0    Probiotic Product (Probiotic Daily) CAPS, Take 1 capsule by mouth in the morning, Disp: , Rfl:     Progesterone 100 MG CAPS, Take 1 capsule by mouth in the morning, Disp: 90 capsule, Rfl: 3    promethazine-dextromethorphan (PHENERGAN-DM) 6.25-15 mg/5 mL oral syrup, Take 5 mL by mouth  3 (three) times a day, Disp: 120 mL, Rfl: 0

## 2025-05-17 ENCOUNTER — OFFICE VISIT (OUTPATIENT)
Dept: URGENT CARE | Facility: CLINIC | Age: 55
End: 2025-05-17
Payer: COMMERCIAL

## 2025-05-17 VITALS
RESPIRATION RATE: 16 BRPM | HEART RATE: 100 BPM | TEMPERATURE: 99 F | WEIGHT: 182 LBS | HEIGHT: 70 IN | OXYGEN SATURATION: 96 % | BODY MASS INDEX: 26.05 KG/M2 | DIASTOLIC BLOOD PRESSURE: 84 MMHG | SYSTOLIC BLOOD PRESSURE: 124 MMHG

## 2025-05-17 DIAGNOSIS — J40 BRONCHITIS: Primary | ICD-10-CM

## 2025-05-17 DIAGNOSIS — N95.1 VASOMOTOR SYMPTOMS DUE TO MENOPAUSE: ICD-10-CM

## 2025-05-17 PROCEDURE — S9083 URGENT CARE CENTER GLOBAL: HCPCS | Performed by: PHYSICIAN ASSISTANT

## 2025-05-17 PROCEDURE — G0382 LEV 3 HOSP TYPE B ED VISIT: HCPCS | Performed by: PHYSICIAN ASSISTANT

## 2025-05-17 RX ORDER — METHYLPREDNISOLONE 4 MG/1
TABLET ORAL
Qty: 1 EACH | Refills: 0 | Status: SHIPPED | OUTPATIENT
Start: 2025-05-17

## 2025-05-17 RX ORDER — BENZONATATE 200 MG/1
200 CAPSULE ORAL 3 TIMES DAILY PRN
Qty: 20 CAPSULE | Refills: 0 | Status: SHIPPED | OUTPATIENT
Start: 2025-05-17 | End: 2025-05-31

## 2025-05-17 NOTE — PROGRESS NOTES
[facility]        NAME: Lala Neely is a 54 y.o. female  : 1970    MRN: [id number]  DATE: May 17, 2025  TIME: 6:36 PM    Assessment and Plan   Bronchitis [J40]  1. Bronchitis  methylPREDNISolone 4 MG tablet therapy pack    benzonatate (TESSALON) 200 MG capsule            Patient Instructions   Over-the-counter cold and flu medications as needed for symptoms.  Drink plenty of fluids.  Follow-up with the PCP in 3 to 5 days if her symptoms do not improve.  Go to ER if symptoms become severe.    Over-the-counter cold medication 101:  - Guaifenesin (ex. Mucinex) is a mucus thinner.  Good for sinus or chest congestion.  Works best if you drink plenty of fluids.  -Dextromethorphan (ex. Delsym, Mucinex DM) is a cough suppressant  -Pseudoephedrine (ex. Sudaphed) is a decongestion and should not be used by those with high blood pressure or heart problems.  -Flonase is a intranasal steroid.  Good for sinus congestion and postnasal drip.  -Antihistamines (Claritin, Zyrtec, Allegra Benadryl) are used for allergies as well as colds for treatment of congestion and ear fullness.      Follow up with PCP in 3-5 days.  Proceed to  ER if symptoms worsen.    If tests have been performed at [facility], our office will contact you with results if changes need to be made to the care plan discussed with you at the visit.  [facility]    Chief Complaint     Chief Complaint   Patient presents with    Cough     Pt reports non-productive cough with onset Monday. States was evaluated in  on Thursday and prescribed Phenergan- dm with only minor relief. States worsening symptoms since.          History of Present Illness       Patient is a 54-year-old female with no significant past medical history presents the office complaining of nonproductive cough for 6 days.  No fevers, congestion, sore throat, or chest pain.  Was seen at another urgent care and given 2 liquid cough medicines.   She has been taking only 1 due to pharmacy recommendation with little to no relief.  Was sick with similar symptoms in January and treated with a Medrol Dosepak and Tessalon Perles with good relief.    URI   This is a new problem. The problem has been unchanged. There has been no fever. Associated symptoms include coughing. Pertinent negatives include no abdominal pain, chest pain, congestion, diarrhea, headaches, nausea, rash, sore throat or vomiting.       Review of Systems   Review of Systems   Constitutional:  Positive for chills. Negative for fever.   HENT:  Negative for congestion and sore throat.    Respiratory:  Positive for cough. Negative for shortness of breath.    Cardiovascular:  Negative for chest pain and palpitations.   Gastrointestinal:  Negative for abdominal pain, diarrhea, nausea and vomiting.   Skin:  Negative for rash.   Neurological:  Negative for dizziness, light-headedness and headaches.         Current Medications     Current Medications[1]    Current Allergies     Allergies as of 05/17/2025 - Reviewed 05/17/2025   Allergen Reaction Noted    Pollen extract  12/03/2013            The following portions of the patient's history were reviewed and updated as appropriate: allergies, current medications, past family history, past medical history, past social history, past surgical history and problem list.     Past Medical History:   Diagnosis Date    Abnormal Pap smear of cervix     Anemia     External hemorrhoids     Lyme disease 11/2004    Migraine     Varicose veins of both lower extremities        Past Surgical History:   Procedure Laterality Date    COLONOSCOPY      DILATION AND CURETTAGE, DIAGNOSTIC / THERAPEUTIC      ENDOMETRIAL BIOPSY  10/21/2008    by suction    HEMORRHOID SURGERY      HYSTEROSCOPY W/ ENDOMETRIAL ABLATION  05/24/2010    POLYPECTOMY      enteroscopic    WISDOM TOOTH EXTRACTION         Family History   Problem Relation Age of Onset    Hyperlipidemia Mother     Heart  "disease Mother     Hypertension Mother     Paget's disease of bone Mother 75    Breast cancer Mother 73        paget's    Thyroid disease Mother     Arthritis Mother     Cancer Mother     Stroke Father     Hypertension Father     Prostate cancer Father 66    Thyroid disease Father     Melanoma Father     Arthritis Father     Cancer Father     Cancer Maternal Grandmother     Esophageal cancer Maternal Grandfather     Cancer Maternal Grandfather     Diabetes Paternal Grandmother     Mental illness Paternal Grandmother     Lung cancer Paternal Grandfather 75    Cancer Paternal Grandfather     No Known Problems Brother     Mental illness Brother     No Known Problems Brother     No Known Problems Son     No Known Problems Son     No Known Problems Maternal Aunt     Anemia Family     Arthritis Family     Depression Family     Anxiety disorder Family     Diabetes Family     Lung cancer Family     Migraines Family     Paget's disease of bone Family     Thyroid disease Family     Varicose Veins Family          Medications have been verified.        Objective   /84 (BP Location: Right arm, Patient Position: Sitting, Cuff Size: Standard)   Pulse 100   Temp 99 °F (37.2 °C) (Tympanic)   Resp 16   Ht 5' 9.5\" (1.765 m)   Wt 82.6 kg (182 lb)   LMP  (LMP Unknown)   SpO2 96%   BMI 26.49 kg/m²   No LMP recorded (lmp unknown). Patient is postmenopausal.       Physical Exam     Physical Exam  Vitals and nursing note reviewed.   Constitutional:       Appearance: Normal appearance. She is well-developed.   HENT:      Head: Normocephalic and atraumatic.      Right Ear: Tympanic membrane, ear canal and external ear normal.      Left Ear: Tympanic membrane, ear canal and external ear normal.      Nose: Nose normal.      Mouth/Throat:      Pharynx: Uvula midline.     Eyes:      General: Lids are normal.      Conjunctiva/sclera: Conjunctivae normal.      Pupils: Pupils are equal, round, and reactive to light. "       Cardiovascular:      Rate and Rhythm: Normal rate and regular rhythm.      Pulses: Normal pulses.      Heart sounds: Normal heart sounds. No murmur heard.     No friction rub. No gallop.   Pulmonary:      Effort: Pulmonary effort is normal.      Breath sounds: Normal breath sounds. No wheezing, rhonchi or rales.      Comments: Dry cough    Musculoskeletal:         General: Normal range of motion.      Cervical back: Neck supple.   Lymphadenopathy:      Cervical: No cervical adenopathy.     Skin:     General: Skin is warm and dry.      Capillary Refill: Capillary refill takes less than 2 seconds.     Neurological:      Mental Status: She is alert.                        [1]   Current Outpatient Medications:     benzonatate (TESSALON) 200 MG capsule, Take 1 capsule (200 mg total) by mouth 3 (three) times a day as needed for cough for up to 14 days, Disp: 20 capsule, Rfl: 0    brompheniramine-pseudoephedrine-DM 30-2-10 MG/5ML syrup, Take 10 mL by mouth 3 (three) times a day as needed for congestion or cough, Disp: 150 mL, Rfl: 0    Ca Carbonate-Mag Hydroxide (ROLAIDS PO), Take by mouth if needed, Disp: , Rfl:     Cholecalciferol (VITAMIN D-1000 MAX ST PO), Take 2 tablets by mouth in the morning, Disp: , Rfl:     estradiol (Vivelle-Dot) 0.0375 MG/24HR, Place 1 patch on the skin 2 (two) times a week, Disp: 24 patch, Rfl: 3    Loratadine (CLARITIN PO), Take by mouth, Disp: , Rfl:     LORazepam (ATIVAN) 0.5 mg tablet, Take 1 tablet (0.5 mg total) by mouth 2 (two) times a day as needed for anxiety, Disp: 30 tablet, Rfl: 0    methylPREDNISolone 4 MG tablet therapy pack, Use as directed on package, Disp: 1 each, Rfl: 0    Probiotic Product (Probiotic Daily) CAPS, Take 1 capsule by mouth in the morning, Disp: , Rfl:     Progesterone 100 MG CAPS, Take 1 capsule by mouth in the morning, Disp: 90 capsule, Rfl: 3    promethazine-dextromethorphan (PHENERGAN-DM) 6.25-15 mg/5 mL oral syrup, Take 5 mL by mouth 3 (three) times a  day, Disp: 120 mL, Rfl: 0

## 2025-05-19 RX ORDER — ESTRADIOL 0.04 MG/D
PATCH, EXTENDED RELEASE TRANSDERMAL
Qty: 24 PATCH | Refills: 1 | Status: SHIPPED | OUTPATIENT
Start: 2025-05-19 | End: 2025-05-22 | Stop reason: SDUPTHER

## 2025-05-22 ENCOUNTER — TELEPHONE (OUTPATIENT)
Age: 55
End: 2025-05-22

## 2025-05-22 DIAGNOSIS — N95.1 VASOMOTOR SYMPTOMS DUE TO MENOPAUSE: ICD-10-CM

## 2025-05-22 RX ORDER — ESTRADIOL 0.04 MG/D
1 PATCH, EXTENDED RELEASE TRANSDERMAL 2 TIMES WEEKLY
Qty: 24 PATCH | Refills: 1 | Status: SHIPPED | OUTPATIENT
Start: 2025-05-22

## 2025-05-22 NOTE — TELEPHONE ENCOUNTER
Pt called requesting a different pharmacy for Estradiol be sent to instead of [facility]. Pt would like medication sent to [facility] in [place]. Pt made aware of message sent.

## 2025-05-23 DIAGNOSIS — N95.1 VASOMOTOR SYMPTOMS DUE TO MENOPAUSE: ICD-10-CM

## 2025-05-23 RX ORDER — PROGESTERONE 100 MG/1
1 CAPSULE ORAL EVERY MORNING
Qty: 90 CAPSULE | Refills: 3 | Status: SHIPPED | OUTPATIENT
Start: 2025-05-23

## 2025-06-05 DIAGNOSIS — N95.1 VASOMOTOR SYMPTOMS DUE TO MENOPAUSE: ICD-10-CM

## 2025-06-06 RX ORDER — PROGESTERONE 100 MG/1
1 CAPSULE ORAL EVERY MORNING
Qty: 90 CAPSULE | Refills: 0 | Status: SHIPPED | OUTPATIENT
Start: 2025-06-06

## 2025-07-08 ENCOUNTER — ANNUAL EXAM (OUTPATIENT)
Age: 55
End: 2025-07-08
Payer: COMMERCIAL

## 2025-07-08 VITALS
WEIGHT: 182.8 LBS | SYSTOLIC BLOOD PRESSURE: 122 MMHG | HEIGHT: 68 IN | DIASTOLIC BLOOD PRESSURE: 62 MMHG | BODY MASS INDEX: 27.71 KG/M2

## 2025-07-08 DIAGNOSIS — N39.3 STRESS INCONTINENCE: ICD-10-CM

## 2025-07-08 DIAGNOSIS — R39.15 URINARY URGENCY: ICD-10-CM

## 2025-07-08 DIAGNOSIS — N95.1 VASOMOTOR SYMPTOMS DUE TO MENOPAUSE: ICD-10-CM

## 2025-07-08 DIAGNOSIS — Z01.419 ENCOUNTER FOR ANNUAL ROUTINE GYNECOLOGICAL EXAMINATION: Primary | ICD-10-CM

## 2025-07-08 DIAGNOSIS — Z12.31 ENCOUNTER FOR SCREENING MAMMOGRAM FOR MALIGNANT NEOPLASM OF BREAST: ICD-10-CM

## 2025-07-08 PROCEDURE — S0612 ANNUAL GYNECOLOGICAL EXAMINA: HCPCS | Performed by: OBSTETRICS & GYNECOLOGY

## 2025-07-08 RX ORDER — PROGESTERONE 100 MG/1
1 CAPSULE ORAL EVERY MORNING
Qty: 90 CAPSULE | Refills: 3 | Status: SHIPPED | OUTPATIENT
Start: 2025-07-08

## 2025-07-08 RX ORDER — ESTRADIOL 0.04 MG/D
1 PATCH, EXTENDED RELEASE TRANSDERMAL 2 TIMES WEEKLY
Qty: 24 PATCH | Refills: 3 | Status: SHIPPED | OUTPATIENT
Start: 2025-07-10

## 2025-07-08 NOTE — PROGRESS NOTES
"Annual Wellness Visit  Name: Lala Neely      : 1970      MRN: [id number]  Encounter Provider: Екатерина Edwards MD  Encounter Date: 2025   Encounter department: [facility] [place]    54 y.o.  yo presents today for her annual exam.:  Assessment & Plan  Encounter for annual routine gynecological examination         Encounter for screening mammogram for malignant neoplasm of breast    Orders:    Mammo screening bilateral w 3d and cad; Future    Vasomotor symptoms due to menopause    Orders:    estradiol (VIVELLE-DOT) 0.0375 MG/24HR; Place 1 patch on the skin 2 (two) times a week    Progesterone 100 MG CAPS; Take 1 capsule by mouth every morning    Stress incontinence    Orders:    Ambulatory Referral to Physical Therapy; Future    Urinary urgency    Orders:    Ambulatory Referral to Physical Therapy; Future             History of Present Illness     Lala Neely is a 54 y.o. female who presents for annual well woman exam.    GYN:  Postmenopausal, no vaginal bleeding  Denies vaginal discharge, labial erythema or lesions  Patient is sexually active with one partner.  Maintained on HRT, discussed pro/con of continued use, for continued use at this time    OB:   female  Kids are 20 and 24.     :  Some increase in urgency  Some stress  Denies constipation, diarrhea    Breast:  No breast mass, skin changes, dimpling, reddening, nipple retraction.  No breast discharge.    General:  ETOH use: social  Tobacco use: no  Recreational drug use: no    Screening:  Cervical cancer: 6/15/23 - NILM, Neg HPV (for repeat )  Breast cancer: 11/15/24 - BIRad 1  Colon cancer: 23 - 5yr recall  STD screening: declined, low risk.    Review of Systems as per HPI       Objective   /62 (BP Location: Right arm, Patient Position: Sitting, Cuff Size: Large)   Ht 5' 8\" (1.727 m)   Wt 82.9 kg (182 lb 12.8 oz)   LMP  (LMP Unknown)   BMI 27.79 kg/m²      Physical Exam  Vitals reviewed. "   Constitutional:       General: She is not in acute distress.     Appearance: Normal appearance. She is well-developed.   Pulmonary:      Effort: No respiratory distress.   Chest:   Breasts:     Right: Normal. No swelling, bleeding, inverted nipple, mass, nipple discharge, skin change or tenderness.      Left: Normal. No swelling, bleeding, inverted nipple, mass, nipple discharge, skin change or tenderness.   Abdominal:      Palpations: Abdomen is soft.      Tenderness: There is no abdominal tenderness.   Genitourinary:     General: Normal vulva.      Labia:         Right: No rash or lesion.         Left: No rash or lesion.       Vagina: Normal. No vaginal discharge.      Cervix: No cervical motion tenderness or discharge.      Uterus: Normal. Not enlarged and not tender.       Adnexa:         Right: No mass or tenderness.          Left: No mass or tenderness.     Lymphadenopathy:      Upper Body:      Right upper body: No axillary adenopathy.      Left upper body: No axillary adenopathy.     Skin:     General: Skin is warm and dry.     Neurological:      Mental Status: She is alert.

## 2025-08-16 DIAGNOSIS — N95.1 VASOMOTOR SYMPTOMS DUE TO MENOPAUSE: ICD-10-CM

## 2025-08-18 DIAGNOSIS — N95.1 VASOMOTOR SYMPTOMS DUE TO MENOPAUSE: ICD-10-CM

## 2025-08-18 RX ORDER — ESTRADIOL 0.04 MG/D
PATCH, EXTENDED RELEASE TRANSDERMAL
Qty: 24 PATCH | Refills: 3 | OUTPATIENT
Start: 2025-08-18

## 2025-08-19 RX ORDER — ESTRADIOL 0.04 MG/D
1 PATCH, EXTENDED RELEASE TRANSDERMAL 2 TIMES WEEKLY
Qty: 24 PATCH | Refills: 1 | Status: SHIPPED | OUTPATIENT
Start: 2025-08-21